# Patient Record
Sex: FEMALE | Race: WHITE | Employment: UNEMPLOYED | URBAN - METROPOLITAN AREA
[De-identification: names, ages, dates, MRNs, and addresses within clinical notes are randomized per-mention and may not be internally consistent; named-entity substitution may affect disease eponyms.]

---

## 2017-01-06 ENCOUNTER — OFFICE VISIT (OUTPATIENT)
Dept: FAMILY MEDICINE CLINIC | Age: 32
End: 2017-01-06

## 2017-01-06 VITALS
SYSTOLIC BLOOD PRESSURE: 102 MMHG | HEART RATE: 125 BPM | HEIGHT: 63 IN | WEIGHT: 132.38 LBS | RESPIRATION RATE: 16 BRPM | DIASTOLIC BLOOD PRESSURE: 74 MMHG | BODY MASS INDEX: 23.46 KG/M2 | TEMPERATURE: 97.5 F | OXYGEN SATURATION: 99 %

## 2017-01-06 DIAGNOSIS — F41.8 DEPRESSION WITH ANXIETY: ICD-10-CM

## 2017-01-06 RX ORDER — CITALOPRAM 20 MG/1
30 TABLET, FILM COATED ORAL DAILY
Qty: 45 TAB | Refills: 5 | Status: SHIPPED | OUTPATIENT
Start: 2017-01-06 | End: 2017-04-26 | Stop reason: SDUPTHER

## 2017-01-06 RX ORDER — CLONAZEPAM 1 MG/1
1 TABLET ORAL 3 TIMES DAILY
Qty: 90 TAB | Refills: 2 | Status: SHIPPED | OUTPATIENT
Start: 2017-01-06 | End: 2017-04-26 | Stop reason: SDUPTHER

## 2017-01-06 RX ORDER — CITALOPRAM 20 MG/1
TABLET, FILM COATED ORAL DAILY
COMMUNITY
End: 2017-01-06 | Stop reason: SDUPTHER

## 2017-01-06 NOTE — MR AVS SNAPSHOT
Visit Information Date & Time Provider Department Dept. Phone Encounter #  
 1/6/2017  4:00 PM Nadir Begum NP [facility] [phone] [id number] Upcoming Health Maintenance Date Due Pneumococcal 19-64 Medium Risk (1 of 1 - PPSV23) 10/15/2005 DTaP/Tdap/Td series (1 - Tdap) 10/15/2007 INFLUENZA AGE 9 TO ADULT 8/1/2016 PAP AKA CERVICAL CYTOLOGY 8/22/2016 Allergies as of 1/6/2017  Review Complete On: 1/6/2017 By: Juan C Nowak LPN Severity Noted Reaction Type Reactions Lortab [Hydrocodone-acetaminophen]  05/20/2010    Other (comments) FACIAL SWELLING Morphine  02/02/2016    Other (comments) Blisters on body Current Immunizations  Reviewed on 9/29/2014 Name Date Influenza Vaccine 9/25/2013 Influenza Vaccine Split 11/12/2012, 10/18/2011, 11/4/2010 Not reviewed this visit You Were Diagnosed With   
  
 Codes Comments Depression with anxiety     ICD-10-CM: F41.8 ICD-9-CM: 300.4 Vitals BP Pulse Temp Resp Height(growth percentile) Weight(growth percentile) 102/74 (!) 125 97.5 °F (36.4 °C) (Oral) 16 5' 3\" (1.6 m) 132 lb 6 oz (60 kg) SpO2 BMI OB Status Smoking Status 99% 23.45 kg/m2 Recent pregnancy Never Smoker Vitals History BMI and BSA Data Body Mass Index Body Surface Area  
 23.45 kg/m 2 1.63 m 2 Preferred Pharmacy Pharmacy Name Phone [facility] [phone] Your Updated Medication List  
  
   
This list is accurate as of: 1/6/17  4:26 PM.  Always use your most recent med list.  
  
  
  
  
 citalopram 20 mg tablet Commonly known as:  Medina Bronson Take 1.5 Tabs by mouth daily. clindamycin 1 % lotion Commonly known as:  CLEOCIN T Apply  to affected area two (2) times a day. use thin film on affected area  
  
 clonazePAM 1 mg tablet Commonly known as:  Mili Man  
 Take 1 Tab by mouth three (3) times daily. As needed for anxiety - must last 30 days * dextroamphetamine-amphetamine 20 mg tablet Commonly known as:  ADDERALL Take 1 Tab by mouth two (2) times a day. - must last 30 days * dextroamphetamine-amphetamine 20 mg tablet Commonly known as:  ADDERALL Take 1 Tab by mouth two (2) times a day. - must last 30 days * dextroamphetamine-amphetamine 20 mg tablet Commonly known as:  ADDERALL Take 1 Tab by mouth two (2) times a day. - must last 30 days  
  
 lamoTRIgine 25 mg tablet Commonly known as: LaMICtal  
Take 1 Tab by mouth two (2) times a day. levonorgestrel-ethinyl estradiol 0.1-20 mg-mcg Tab Commonly known as:  Ottie Bark Take 1 Tab by mouth daily. * Notice: This list has 3 medication(s) that are the same as other medications prescribed for you. Read the directions carefully, and ask your doctor or other care provider to review them with you. Prescriptions Printed Refills  
 clonazePAM (KLONOPIN) 1 mg tablet 2 Sig: Take 1 Tab by mouth three (3) times daily. As needed for anxiety - must last 30 days Class: Print Route: Oral  
  
Prescriptions Sent to Pharmacy Refills  
 citalopram (CELEXA) 20 mg tablet 5 Sig: Take 1.5 Tabs by mouth daily. Class: Normal  
 Pharmacy: [facility] [place] [address] #: [phone] Route: Oral  
  
Introducing [facility] & HEALTH SERVICES! July Sanchez introduces [employer] patient portal. Now you can access parts of your medical record, email your doctor's office, and request medication refills online. 1. In your internet browser, go to [email or web address] 2. Click on the First Time User? Click Here link in the Sign In box. You will see the New Member Sign Up page. 3. Enter your FanHero Access Code exactly as it appears below.  You will not need to use this code after youve completed the sign-up process. If you do not sign up before the expiration date, you must request a new code. · [employer] Access Code: [email or web address] Expires: 4/6/2017  4:26 PM 
 
4. Enter the last four digits of your Social Security Number (xxxx) and Date of Birth (mm/dd/yyyy) as indicated and click Submit. You will be taken to the next sign-up page. 5. Create a [employer] ID. This will be your [employer] login ID and cannot be changed, so think of one that is secure and easy to remember. 6. Create a [employer] password. You can change your password at any time. 7. Enter your Password Reset Question and Answer. This can be used at a later time if you forget your password. 8. Enter your e-mail address. You will receive e-mail notification when new information is available in [address] 9. Click Sign Up. You can now view and download portions of your medical record. 10. Click the Download Summary menu link to download a portable copy of your medical information. If you have questions, please visit the Frequently Asked Questions section of the [employer] website. Remember, [employer] is NOT to be used for urgent needs. For medical emergencies, dial 911. Now available from your iPhone and Android! Please provide this summary of care documentation to your next provider. Your primary care clinician is listed as Jensen Cordero. If you have any questions after today's visit, please call [phone].

## 2017-01-06 NOTE — PROGRESS NOTES
1. Have you been to the ER, urgent care clinic since your last visit? Hospitalized since your last visit? Yes JW x 1 wk for childbirth    2. Have you seen or consulted any other health care providers outside of the [facility] [place] [facility] since your last visit? Include any pap smears or colon screening.  No    Chief Complaint   Patient presents with    Follow-up     med ck

## 2017-01-09 NOTE — PROGRESS NOTES
HISTORY OF PRESENT ILLNESS  Aubrey Prakash is a 28 y.o. female. HPI  1 week postpartum  Needs to restart her depression meds and anxiety meds  Had to change to zoloft due to pregnancy and stop the klonopin and adderall  Anxiety has been severe  Good bonding though with baby and holding her today in the office lovingly    ROS  A comprehensive review of system was obtained and negative except findings in the HPI    Visit Vitals    /74    Pulse (!) 125    Temp 97.5 °F (36.4 °C) (Oral)    Resp 16    Ht 5' 3\" (1.6 m)    Wt 132 lb 6 oz (60 kg)    SpO2 99%    BMI 23.45 kg/m2     Physical Exam   Constitutional: She is oriented to person, place, and time. She appears well-developed and well-nourished. Neck: No JVD present. Cardiovascular: Normal rate, regular rhythm and intact distal pulses. Exam reveals no gallop and no friction rub. No murmur heard. Pulmonary/Chest: Effort normal and breath sounds normal. No respiratory distress. She has no wheezes. Musculoskeletal: She exhibits no edema. Neurological: She is alert and oriented to person, place, and time. Skin: Skin is warm. Nursing note and vitals reviewed. ASSESSMENT and PLAN  Encounter Diagnoses   Name Primary?  Depression with anxiety      Orders Placed This Encounter    citalopram (CELEXA) 20 mg tablet    clonazePAM (KLONOPIN) 1 mg tablet     Change back to her celexa and refills updated on the klonopin  Will recheck 3 weeks and at that time discuss restarting the adderall  I have discussed the diagnosis with the patient and the intended plan as seen in the above orders. The patient has received an after-visit summary and questions were answered concerning future plans. Patient conveyed understanding of the plan at the time of the visit.     Klever Harris, MSN, ANP  1/9/2017

## 2017-04-26 ENCOUNTER — OFFICE VISIT (OUTPATIENT)
Dept: FAMILY MEDICINE CLINIC | Age: 32
End: 2017-04-26

## 2017-04-26 VITALS
HEART RATE: 105 BPM | OXYGEN SATURATION: 99 % | RESPIRATION RATE: 18 BRPM | DIASTOLIC BLOOD PRESSURE: 87 MMHG | SYSTOLIC BLOOD PRESSURE: 122 MMHG | TEMPERATURE: 98.7 F | HEIGHT: 63 IN | WEIGHT: 130.6 LBS | BODY MASS INDEX: 23.14 KG/M2

## 2017-04-26 DIAGNOSIS — F41.8 DEPRESSION WITH ANXIETY: ICD-10-CM

## 2017-04-26 RX ORDER — CITALOPRAM 40 MG/1
40 TABLET, FILM COATED ORAL DAILY
Qty: 30 TAB | Refills: 5 | Status: SHIPPED | OUTPATIENT
Start: 2017-04-26 | End: 2017-10-11 | Stop reason: SDUPTHER

## 2017-04-26 RX ORDER — CLONAZEPAM 1 MG/1
1 TABLET ORAL 3 TIMES DAILY
Qty: 90 TAB | Refills: 2 | Status: SHIPPED | OUTPATIENT
Start: 2017-04-26 | End: 2017-07-21 | Stop reason: SDUPTHER

## 2017-04-26 RX ORDER — LAMOTRIGINE 25 MG/1
25 TABLET ORAL 2 TIMES DAILY
Qty: 60 TAB | Refills: 2 | Status: SHIPPED | OUTPATIENT
Start: 2017-04-26 | End: 2017-07-21 | Stop reason: ALTCHOICE

## 2017-04-26 NOTE — MR AVS SNAPSHOT
Visit Information Date & Time Provider Department Dept. Phone Encounter #  
 4/26/2017  3:30 PM Reta Aundrea, HERB [address] [facility] [phone] [id number] Upcoming Health Maintenance Date Due Pneumococcal 19-64 Medium Risk (1 of 1 - PPSV23) 10/15/2005 DTaP/Tdap/Td series (1 - Tdap) 10/15/2007 INFLUENZA AGE 9 TO ADULT 8/1/2016 PAP AKA CERVICAL CYTOLOGY 8/22/2016 Allergies as of 4/26/2017  Review Complete On: 4/26/2017 By: Dottie Castaneda LPN Severity Noted Reaction Type Reactions Lortab [Hydrocodone-acetaminophen]  05/20/2010    Other (comments) FACIAL SWELLING Morphine  02/02/2016    Other (comments) Blisters on body Current Immunizations  Reviewed on 9/29/2014 Name Date Influenza Vaccine 9/25/2013 Influenza Vaccine Split 11/12/2012, 10/18/2011, 11/4/2010 Not reviewed this visit You Were Diagnosed With   
  
 Codes Comments Depression with anxiety     ICD-10-CM: F41.8 ICD-9-CM: 300.4 Vitals BP Pulse Temp Resp Height(growth percentile) Weight(growth percentile) 122/87 (!) 105 98.7 °F (37.1 °C) (Oral) 18 5' 3\" (1.6 m) 130 lb 9.6 oz (59.2 kg) SpO2 BMI OB Status Smoking Status 99% 23.13 kg/m2 Recent pregnancy Never Smoker Vitals History BMI and BSA Data Body Mass Index Body Surface Area  
 23.13 kg/m 2 1.62 m 2 Preferred Pharmacy Pharmacy Name Phone [id number] [address] [phone] Your Updated Medication List  
  
   
This list is accurate as of: 4/26/17  3:56 PM.  Always use your most recent med list.  
  
  
  
  
 citalopram 40 mg tablet Commonly known as:  Sanford Kerhonkson Take 1 Tab by mouth daily. clindamycin 1 % lotion Commonly known as:  CLEOCIN T Apply  to affected area two (2) times a day. use thin film on affected area  
  
 clonazePAM 1 mg tablet Commonly known as:  Sury Medici  
 Take 1 Tab by mouth three (3) times daily. As needed for anxiety - must last 30 days * dextroamphetamine-amphetamine 20 mg tablet Commonly known as:  ADDERALL Take 1 Tab by mouth two (2) times a day. - must last 30 days * dextroamphetamine-amphetamine 20 mg tablet Commonly known as:  ADDERALL Take 1 Tab by mouth two (2) times a day. - must last 30 days * dextroamphetamine-amphetamine 20 mg tablet Commonly known as:  ADDERALL Take 1 Tab by mouth two (2) times a day. - must last 30 days  
  
 lamoTRIgine 25 mg tablet Commonly known as: LaMICtal  
Take 1 Tab by mouth two (2) times a day. levonorgestrel-ethinyl estradiol 0.1-20 mg-mcg Tab Commonly known as:  Roche Prayer Take 1 Tab by mouth daily. * Notice: This list has 3 medication(s) that are the same as other medications prescribed for you. Read the directions carefully, and ask your doctor or other care provider to review them with you. Prescriptions Printed Refills  
 clonazePAM (KLONOPIN) 1 mg tablet 2 Sig: Take 1 Tab by mouth three (3) times daily. As needed for anxiety - must last 30 days Class: Print Route: Oral  
  
Prescriptions Sent to Pharmacy Refills  
 citalopram (CELEXA) 40 mg tablet 5 Sig: Take 1 Tab by mouth daily. Class: Normal  
 Pharmacy: [id number] [place] [address] Ph #: [phone] Route: Oral  
 lamoTRIgine (LAMICTAL) 25 mg tablet 2 Sig: Take 1 Tab by mouth two (2) times a day. Class: Normal  
 Pharmacy: [facility] [address] [place] [address] Ph #: [phone] Route: Oral  
  
Introducing [facility] & HEALTH SERVICES! Елена Johnson introduces [employer] patient portal. Now you can access parts of your medical record, email your doctor's office, and request medication refills online. 1. In your internet browser, go to [email or web address] 2. Click on the First Time User? Click Here link in the Sign In box. You will see the New Member Sign Up page. 3. Enter your [employer] Access Code exactly as it appears below. You will not need to use this code after youve completed the sign-up process. If you do not sign up before the expiration date, you must request a new code. · [employer] Access Code: [email or web address] Expires: 7/25/2017  3:56 PM 
 
4. Enter the last four digits of your Social Security Number (xxxx) and Date of Birth (mm/dd/yyyy) as indicated and click Submit. You will be taken to the next sign-up page. 5. Create a [employer] ID. This will be your [employer] login ID and cannot be changed, so think of one that is secure and easy to remember. 6. Create a [employer] password. You can change your password at any time. 7. Enter your Password Reset Question and Answer. This can be used at a later time if you forget your password. 8. Enter your e-mail address. You will receive e-mail notification when new information is available in [address] 9. Click Sign Up. You can now view and download portions of your medical record. 10. Click the Download Summary menu link to download a portable copy of your medical information. If you have questions, please visit the Frequently Asked Questions section of the [employer] website. Remember, [employer] is NOT to be used for urgent needs. For medical emergencies, dial 911. Now available from your iPhone and Android! Please provide this summary of care documentation to your next provider. Your primary care clinician is listed as Vergil Zonia. If you have any questions after today's visit, please call [phone].

## 2017-04-26 NOTE — PROGRESS NOTES
Chief Complaint   Patient presents with    Medication Refill    Medication Evaluation     Discuss changing medication      1. Have you been to the ER, urgent care clinic since your last visit? Hospitalized since your last visit? No    2. Have you seen or consulted any other health care providers outside of the [facility] since your last visit? Include any pap smears or colon screening.  No.

## 2017-04-28 NOTE — PROGRESS NOTES
HISTORY OF PRESENT ILLNESS  Yoel Awad is a 28 y.o. female. HPI  She is being treated for anxiety and depression without having the ADD meds for now  Having a lot of trouble with worry and anxiety  Wants to take something to level out her mood    ROS  A comprehensive review of system was obtained and negative except findings in the HPI    Visit Vitals    /87    Pulse (!) 105    Temp 98.7 °F (37.1 °C) (Oral)    Resp 18    Ht 5' 3\" (1.6 m)    Wt 130 lb 9.6 oz (59.2 kg)    SpO2 99%    BMI 23.13 kg/m2     Physical Exam   Constitutional: She is oriented to person, place, and time. She appears well-developed and well-nourished. Neck: No JVD present. Cardiovascular: Normal rate, regular rhythm and intact distal pulses. Exam reveals no gallop and no friction rub. No murmur heard. Pulmonary/Chest: Effort normal and breath sounds normal. No respiratory distress. She has no wheezes. Musculoskeletal: She exhibits no edema. Neurological: She is alert and oriented to person, place, and time. Skin: Skin is warm. Nursing note and vitals reviewed. ASSESSMENT and PLAN  Encounter Diagnoses   Name Primary?  Depression with anxiety      Orders Placed This Encounter    citalopram (CELEXA) 40 mg tablet    clonazePAM (KLONOPIN) 1 mg tablet    lamoTRIgine (LAMICTAL) 25 mg tablet     Keep the meds as is but add lamictal and refill the celexa etc as prescribed  I have discussed the diagnosis with the patient and the intended plan as seen in the above orders. The patient has received an after-visit summary and questions were answered concerning future plans. Patient conveyed understanding of the plan at the time of the visit.     Lee Alanis, MSN, ANP  4/27/2017

## 2017-07-21 ENCOUNTER — OFFICE VISIT (OUTPATIENT)
Dept: FAMILY MEDICINE CLINIC | Age: 32
End: 2017-07-21

## 2017-07-21 VITALS
OXYGEN SATURATION: 99 % | DIASTOLIC BLOOD PRESSURE: 82 MMHG | BODY MASS INDEX: 23.04 KG/M2 | HEIGHT: 63 IN | WEIGHT: 130 LBS | RESPIRATION RATE: 16 BRPM | SYSTOLIC BLOOD PRESSURE: 131 MMHG | TEMPERATURE: 98.1 F | HEART RATE: 74 BPM

## 2017-07-21 DIAGNOSIS — F98.8 ADD (ATTENTION DEFICIT DISORDER): ICD-10-CM

## 2017-07-21 DIAGNOSIS — F41.8 DEPRESSION WITH ANXIETY: Primary | ICD-10-CM

## 2017-07-21 RX ORDER — CLONAZEPAM 1 MG/1
1 TABLET ORAL 3 TIMES DAILY
Qty: 90 TAB | Refills: 2 | Status: SHIPPED | OUTPATIENT
Start: 2017-07-21 | End: 2017-08-01 | Stop reason: SDUPTHER

## 2017-07-21 NOTE — MR AVS SNAPSHOT
Visit Information Date & Time Provider Department Dept. Phone Encounter #  
 7/21/2017 11:15 AM Rey Rodriguez NP [address] [facility] [phone] [id number] Upcoming Health Maintenance Date Due Pneumococcal 19-64 Medium Risk (1 of 1 - PPSV23) 10/15/2005 DTaP/Tdap/Td series (1 - Tdap) 10/15/2007 PAP AKA CERVICAL CYTOLOGY 8/22/2016 INFLUENZA AGE 9 TO ADULT 8/1/2017 Allergies as of 7/21/2017  Review Complete On: 7/21/2017 By: Christian Ferrara LPN Severity Noted Reaction Type Reactions Lortab [Hydrocodone-acetaminophen]  05/20/2010    Other (comments) FACIAL SWELLING Morphine  02/02/2016    Other (comments) Blisters on body Current Immunizations  Reviewed on 9/29/2014 Name Date Influenza Vaccine 9/25/2013 Influenza Vaccine Split 11/12/2012, 10/18/2011, 11/4/2010 Not reviewed this visit You Were Diagnosed With   
  
 Codes Comments Depression with anxiety    -  Primary ICD-10-CM: F41.8 ICD-9-CM: 300.4 ADD (attention deficit disorder)     ICD-10-CM: F98.8 ICD-9-CM: 314.00 Vitals BP Pulse Temp Resp Height(growth percentile) Weight(growth percentile) 131/82 (BP 1 Location: Right arm, BP Patient Position: Sitting) 74 98.1 °F (36.7 °C) (Oral) 16 5' 3\" (1.6 m) 130 lb (59 kg) LMP SpO2 BMI OB Status Smoking Status 06/28/2017 (Exact Date) 99% 23.03 kg/m2 Having regular periods Never Smoker Vitals History BMI and BSA Data Body Mass Index Body Surface Area 23.03 kg/m 2 1.62 m 2 Preferred Pharmacy Pharmacy Name Phone [facility] [phone] Your Updated Medication List  
  
   
This list is accurate as of: 7/21/17 11:41 AM.  Always use your most recent med list.  
  
  
  
  
 citalopram 40 mg tablet Commonly known as:  Luzmaria Park Take 1 Tab by mouth daily. clonazePAM 1 mg tablet Commonly known as:  Florida Cord Take 1 Tab by mouth three (3) times daily. As needed for anxiety - must last 30 days Prescriptions Printed Refills  
 clonazePAM (KLONOPIN) 1 mg tablet 2 Sig: Take 1 Tab by mouth three (3) times daily. As needed for anxiety - must last 30 days Class: Print Route: Oral  
  
Introducing [facility] & HE[facility] SERVICES! Bharatpaul Diane introduces [employer] patient portal. Now you can access parts of your medical record, email your doctor's office, and request medication refills online. 1. In your internet browser, go to [email or web address] 2. Click on the First Time User? Click Here link in the Sign In box. You will see the New Member Sign Up page. 3. Enter your [employer] Access Code exactly as it appears below. You will not need to use this code after youve completed the sign-up process. If you do not sign up before the expiration date, you must request a new code. · [employer] Access Code: [email or web address] Expires: 7/25/2017  3:56 PM 
 
4. Enter the last four digits of your Social Security Number (xxxx) and Date of Birth (mm/dd/yyyy) as indicated and click Submit. You will be taken to the next sign-up page. 5. Create a [employer]ie ID. This will be your [employer] login ID and cannot be changed, so think of one that is secure and easy to remember. 6. Create a [employer] password. You can change your password at any time. 7. Enter your Password Reset Question and Answer. This can be used at a later time if you forget your password. 8. Enter your e-mail address. You will receive e-mail notification when new information is available in [address] 9. Click Sign Up. You can now view and download portions of your medical record. 10. Click the Download Summary menu link to download a portable copy of your medical information.  
 
If you have questions, please visit the Frequently Asked Questions section of the [employer]. Remember, [employer] is NOT to be used for urgent needs. For medical emergencies, dial 911. Now available from your iPhone and Android! Please provide this summary of care documentation to your next provider. Your primary care clinician is listed as Luis White. If you have any questions after today's visit, please call [phone].

## 2017-07-24 NOTE — PROGRESS NOTES
HISTORY OF PRESENT ILLNESS  Josue Best is a 28 y.o. female. HPI  She is here for her refill of celexa and klonopin  Mood has been bad, youngest son has been dx with Aspergers and she is trying to get him ready for Kindergarden in the fall  Also has 5 week old baby   Stressed out and no help with father of the boys    ROS  A comprehensive review of system was obtained and negative except findings in the HPI    Visit Vitals    /82 (BP 1 Location: Right arm, BP Patient Position: Sitting)    Pulse 74    Temp 98.1 °F (36.7 °C) (Oral)    Resp 16    Ht 5' 3\" (1.6 m)    Wt 130 lb (59 kg)    LMP 06/28/2017 (Exact Date)    SpO2 99%    BMI 23.03 kg/m2     Physical Exam   Constitutional: She is oriented to person, place, and time. She appears well-developed and well-nourished. Neck: No JVD present. Cardiovascular: Normal rate, regular rhythm and intact distal pulses. Exam reveals no gallop and no friction rub. No murmur heard. Pulmonary/Chest: Effort normal and breath sounds normal. No respiratory distress. She has no wheezes. Musculoskeletal: She exhibits no edema. Neurological: She is alert and oriented to person, place, and time. Skin: Skin is warm. Nursing note and vitals reviewed. ASSESSMENT and PLAN  Encounter Diagnoses   Name Primary?  Depression with anxiety Yes    ADD (attention deficit disorder)      Orders Placed This Encounter    clonazePAM (KLONOPIN) 1 mg tablet     Refills given of klonopin 1mg bid  Follow up prn  No change in celexa    I have discussed the diagnosis with the patient and the intended plan as seen in the above orders. The patient has received an after-visit summary and questions were answered concerning future plans. Patient conveyed understanding of the plan at the time of the visit.     Paola Mott, MSN, ANP  7/23/2017

## 2017-08-01 DIAGNOSIS — F41.8 DEPRESSION WITH ANXIETY: ICD-10-CM

## 2017-08-01 RX ORDER — CLONAZEPAM 1 MG/1
1 TABLET ORAL 3 TIMES DAILY
Qty: 60 TAB | Refills: 0 | OUTPATIENT
Start: 2017-08-01 | End: 2017-10-11 | Stop reason: SDUPTHER

## 2017-10-11 ENCOUNTER — OFFICE VISIT (OUTPATIENT)
Dept: FAMILY MEDICINE CLINIC | Age: 32
End: 2017-10-11

## 2017-10-11 VITALS
OXYGEN SATURATION: 99 % | HEART RATE: 95 BPM | HEIGHT: 63 IN | WEIGHT: 124.4 LBS | SYSTOLIC BLOOD PRESSURE: 122 MMHG | TEMPERATURE: 98.3 F | BODY MASS INDEX: 22.04 KG/M2 | RESPIRATION RATE: 18 BRPM | DIASTOLIC BLOOD PRESSURE: 60 MMHG

## 2017-10-11 DIAGNOSIS — F98.8 ATTENTION DEFICIT DISORDER, UNSPECIFIED HYPERACTIVITY PRESENCE: Primary | ICD-10-CM

## 2017-10-11 DIAGNOSIS — F41.8 DEPRESSION WITH ANXIETY: ICD-10-CM

## 2017-10-11 RX ORDER — CITALOPRAM 40 MG/1
40 TABLET, FILM COATED ORAL DAILY
Qty: 30 TAB | Refills: 5 | Status: SHIPPED | OUTPATIENT
Start: 2017-10-11 | End: 2018-06-01 | Stop reason: SDUPTHER

## 2017-10-11 RX ORDER — DEXTROAMPHETAMINE SACCHARATE, AMPHETAMINE ASPARTATE, DEXTROAMPHETAMINE SULFATE AND AMPHETAMINE SULFATE 2.5; 2.5; 2.5; 2.5 MG/1; MG/1; MG/1; MG/1
10 TABLET ORAL 2 TIMES DAILY
Qty: 60 TAB | Refills: 0 | Status: SHIPPED | OUTPATIENT
Start: 2017-10-11 | End: 2017-11-15 | Stop reason: SDUPTHER

## 2017-10-11 RX ORDER — CLONAZEPAM 1 MG/1
1 TABLET ORAL 3 TIMES DAILY
Qty: 90 TAB | Refills: 1 | Status: SHIPPED | OUTPATIENT
Start: 2017-10-11 | End: 2017-12-12 | Stop reason: SDUPTHER

## 2017-10-11 NOTE — MR AVS SNAPSHOT
Visit Information Date & Time Provider Department Dept. Phone Encounter #  
 10/11/2017  3:30 PM Citlali De La Cruz, [address] [phone] [id number] Upcoming Health Maintenance Date Due Pneumococcal 19-64 Medium Risk (1 of 1 - PPSV23) 10/15/2005 DTaP/Tdap/Td series (1 - Tdap) 10/15/2007 PAP AKA CERVICAL CYTOLOGY 8/22/2016 INFLUENZA AGE 9 TO ADULT 8/1/2017 Allergies as of 10/11/2017  Review Complete On: 10/11/2017 By: Lacey Antonio LPN Severity Noted Reaction Type Reactions Lortab [Hydrocodone-acetaminophen]  05/20/2010    Other (comments) FACIAL SWELLING Morphine  02/02/2016    Other (comments) Blisters on body - 
 Denies Medication Allergy/Reaction on 10/11/2017 Current Immunizations  Reviewed on 9/29/2014 Name Date Influenza Vaccine 9/25/2013 Influenza Vaccine Split 11/12/2012, 10/18/2011, 11/4/2010 Not reviewed this visit You Were Diagnosed With   
  
 Codes Comments Attention deficit disorder, unspecified hyperactivity presence    -  Primary ICD-10-CM: F98.8 ICD-9-CM: 314.00 Depression with anxiety     ICD-10-CM: F41.8 ICD-9-CM: 300.4 Vitals BP Pulse Temp Resp Height(growth percentile) Weight(growth percentile) 122/60 95 98.3 °F (36.8 °C) (Oral) 18 5' 3\" (1.6 m) 124 lb 6.4 oz (56.4 kg) SpO2 BMI OB Status Smoking Status 99% 22.04 kg/m2 Having regular periods Never Smoker Vitals History BMI and BSA Data Body Mass Index Body Surface Area 22.04 kg/m 2 1.58 m 2 Preferred Pharmacy Pharmacy Name Phone [id number] [place] [phone] Your Updated Medication List  
  
   
This list is accurate as of: 10/11/17  3:49 PM.  Always use your most recent med list.  
  
  
  
  
 citalopram 40 mg tablet Commonly known as:  Frida Adorno Take 1 Tab by mouth daily. clonazePAM 1 mg tablet Commonly known as:  Hailey Broaden Take 1 Tab by mouth three (3) times daily. As needed for anxiety - must last 30 days  
  
 dextroamphetamine-amphetamine 10 mg tablet Commonly known as:  ADDERALL Take 1 Tab (10 mg total) by mouth two (2) times a day. Max Daily Amount: 20 mg  
  
  
  
  
Prescriptions Printed Refills  
 clonazePAM (KLONOPIN) 1 mg tablet 1 Sig: Take 1 Tab by mouth three (3) times daily. As needed for anxiety - must last 30 days Class: Print Route: Oral  
 dextroamphetamine-amphetamine (ADDERALL) 10 mg tablet 0 Sig: Take 1 Tab (10 mg total) by mouth two (2) times a day. Max Daily Amount: 20 mg  
 Class: Print Route: Oral  
  
Prescriptions Sent to Pharmacy Refills  
 citalopram (CELEXA) 40 mg tablet 5 Sig: Take 1 Tab by mouth daily. Class: Normal  
 Pharmacy: [id number] [address] [place] [address] #: [phone] Route: Oral  
  
Introducing [facility] & HEALTH SERVICES! Katlin Vasquez introduces Localler patient portal. Now you can access parts of your medical record, email your doctor's office, and request medication refills online. 1. In your internet browser, go to [email or web address] 2. Click on the First Time User? Click Here link in the Sign In box. You will see the New Member Sign Up page. 3. Enter your Localler Access Code exactly as it appears below. You will not need to use this code after youve completed the sign-up process. If you do not sign up before the expiration date, you must request a new code. · Localler Access Code: [email or web address] Expires: 1/9/2018  3:49 PM 
 
4. Enter the last four digits of your Social Security Number (xxxx) and Date of Birth (mm/dd/yyyy) as indicated and click Submit. You will be taken to the next sign-up page. 5. Create a Localler ID. This will be your Localler login ID and cannot be changed, so think of one that is secure and easy to remember. 6. Create a MoBeam password. You can change your password at any time. 7. Enter your Password Reset Question and Answer. This can be used at a later time if you forget your password. 8. Enter your e-mail address. You will receive e-mail notification when new information is available in [address] 9. Click Sign Up. You can now view and download portions of your medical record. 10. Click the Download Summary menu link to download a portable copy of your medical information. If you have questions, please visit the Frequently Asked Questions section of the MoBeam website. Remember, MoBeam is NOT to be used for urgent needs. For medical emergencies, dial 911. Now available from your iPhone and Android! Please provide this summary of care documentation to your next provider. Your primary care clinician is listed as Marnie Hahn. If you have any questions after today's visit, please call [phone].

## 2017-10-11 NOTE — PROGRESS NOTES
Chief Complaint   Patient presents with    Medication Refill     1. Have you been to the ER, urgent care clinic since your last visit? Hospitalized since your last visit? No    2. Have you seen or consulted any other health care providers outside of the [address] [place] [address] since your last visit? Include any pap smears or colon screening.  No

## 2017-10-13 NOTE — PROGRESS NOTES
HISTORY OF PRESENT ILLNESS  Lonnie Morgan is a 28 y.o. female. 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 She appears well-developed and well-nourished. Neck: No JVD present. Cardiovascular: Normal rate, regular rhythm and intact distal pulses. Exam reveals no gallop and no friction rub. No murmur heard. Pulmonary/Chest: Effort normal and breath sounds normal. No respiratory distress. She has no wheezes. Musculoskeletal: She exhibits no edema. Neurological: She is alert and oriented to person, place, and time. Skin: Skin is warm. Nursing note and vitals reviewed. ASSESSMENT and PLAN  Encounter Diagnoses   Name Primary?  Attention deficit disorder, unspecified hyperactivity presence Yes    Depression with anxiety      Orders Placed This Encounter    clonazePAM (KLONOPIN) 1 mg tablet    citalopram (CELEXA) 40 mg tablet    dextroamphetamine-amphetamine (ADDERALL) 10 mg tablet     Given klonopin and adderall rx today  Refilled celexa as well  Recheck 3 mo  I have discussed the diagnosis with the patient and the intended plan as seen in the above orders. The patient has received an after-visit summary and questions were answered concerning future plans. Patient conveyed understanding of the plan at the time of the visit.     Chucky Garner, MSN, ANP  10/12/2017

## 2017-11-15 ENCOUNTER — OFFICE VISIT (OUTPATIENT)
Dept: FAMILY MEDICINE CLINIC | Age: 32
End: 2017-11-15

## 2017-11-15 VITALS
OXYGEN SATURATION: 98 % | BODY MASS INDEX: 22.35 KG/M2 | WEIGHT: 126.13 LBS | DIASTOLIC BLOOD PRESSURE: 72 MMHG | SYSTOLIC BLOOD PRESSURE: 128 MMHG | RESPIRATION RATE: 16 BRPM | HEIGHT: 63 IN | HEART RATE: 84 BPM | TEMPERATURE: 97.7 F

## 2017-11-15 DIAGNOSIS — F98.8 ATTENTION DEFICIT DISORDER, UNSPECIFIED HYPERACTIVITY PRESENCE: Primary | ICD-10-CM

## 2017-11-15 RX ORDER — DEXTROAMPHETAMINE SACCHARATE, AMPHETAMINE ASPARTATE, DEXTROAMPHETAMINE SULFATE AND AMPHETAMINE SULFATE 5; 5; 5; 5 MG/1; MG/1; MG/1; MG/1
20 TABLET ORAL DAILY
Qty: 30 TAB | Refills: 0 | Status: SHIPPED | OUTPATIENT
Start: 2017-11-15 | End: 2017-12-12 | Stop reason: SDUPTHER

## 2017-11-15 RX ORDER — DEXTROAMPHETAMINE SACCHARATE, AMPHETAMINE ASPARTATE, DEXTROAMPHETAMINE SULFATE AND AMPHETAMINE SULFATE 2.5; 2.5; 2.5; 2.5 MG/1; MG/1; MG/1; MG/1
10 TABLET ORAL DAILY
Qty: 30 TAB | Refills: 0 | Status: SHIPPED | OUTPATIENT
Start: 2017-11-15 | End: 2017-11-15 | Stop reason: SDUPTHER

## 2017-11-15 NOTE — PROGRESS NOTES
HISTORY OF PRESENT ILLNESS  Niurka Lawton is a 33 y.o. female. HPI  Patient returns to office for follow up ADD. Stable on medication without weight loss, decreased appetite, insomnia, or tremor. Good focus control. Gets three months of scripts at a time. She is managed on Adderall 20mg bid but wants to take just at night now. 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 She appears well-developed and well-nourished. Neck: No JVD present. Cardiovascular: Normal rate, regular rhythm and intact distal pulses. Exam reveals no gallop and no friction rub. No murmur heard. Pulmonary/Chest: Effort normal and breath sounds normal. No respiratory distress. She has no wheezes. Musculoskeletal: She exhibits no edema. Neurological: She is alert and oriented to person, place, and time. Skin: Skin is warm. Nursing note and vitals reviewed. ASSESSMENT and PLAN  Encounter Diagnoses   Name Primary?  Attention deficit disorder, unspecified hyperactivity presence Yes     Orders Placed This Encounter    dextroamphetamine-amphetamine (ADDERALL) 20 mg tablet     Given adderall 20mg refill   Reviewed adr/se of med and recheck 1mo  No med changes at this time    I have discussed the diagnosis with the patient and the intended plan as seen in the above orders. The patient has received an after-visit summary and questions were answered concerning future plans. Patient conveyed understanding of the plan at the time of the visit.     Ryan Washington, MSN, ANP  11/15/2017

## 2017-11-15 NOTE — PROGRESS NOTES
1. Have you been to the ER, urgent care clinic since your last visit? Hospitalized since your last visit? No    2. Have you seen or consulted any other health care providers outside of the [address] [place] [address] since your last visit? Include any pap smears or colon screening.  No    Chief Complaint   Patient presents with    Follow-up     3 mo f/u, med ck

## 2017-11-15 NOTE — MR AVS SNAPSHOT
Visit Information Date & Time Provider Department Dept. Phone Encounter #  
 11/15/2017 11:15 AM Yu Trinh NP [facility] [phone] [id number] Follow-up Instructions Return in about 3 weeks (around 12/6/2017) for med check. Upcoming Health Maintenance Date Due Pneumococcal 19-64 Medium Risk (1 of 1 - PPSV23) 10/15/2005 DTaP/Tdap/Td series (1 - Tdap) 10/15/2007 PAP AKA CERVICAL CYTOLOGY 8/22/2016 Influenza Age 5 to Adult 8/1/2017 Allergies as of 11/15/2017  Review Complete On: 11/15/2017 By: Yu Trinh NP Severity Noted Reaction Type Reactions Lortab [Hydrocodone-acetaminophen]  05/20/2010    Other (comments) FACIAL SWELLING Morphine  02/02/2016    Other (comments) Blisters on body - 
 Denies Medication Allergy/Reaction on 10/11/2017 Current Immunizations  Reviewed on 9/29/2014 Name Date Influenza Vaccine 9/25/2013 Influenza Vaccine Split 11/12/2012, 10/18/2011, 11/4/2010 Not reviewed this visit You Were Diagnosed With   
  
 Codes Comments Attention deficit disorder, unspecified hyperactivity presence    -  Primary ICD-10-CM: F98.8 ICD-9-CM: 314.00 Vitals BP Pulse Temp Resp Height(growth percentile) Weight(growth percentile) 128/72 84 97.7 °F (36.5 °C) (Oral) 16 5' 3\" (1.6 m) 126 lb 2 oz (57.2 kg) LMP SpO2 BMI OB Status Smoking Status 10/17/2017 (Approximate) 98% 22.34 kg/m2 Having regular periods Never Smoker Vitals History BMI and BSA Data Body Mass Index Body Surface Area  
 22.34 kg/m 2 1.59 m 2 Preferred Pharmacy Pharmacy Name Phone [id number] [place] [phone] Your Updated Medication List  
  
   
This list is accurate as of: 11/15/17 11:25 AM.  Always use your most recent med list.  
  
  
  
  
 citalopram 40 mg tablet Commonly known as:  Justen Diaz Take 1 Tab by mouth daily. clonazePAM 1 mg tablet Commonly known as:  Cassie Blaze Take 1 Tab by mouth three (3) times daily. As needed for anxiety - must last 30 days  
  
 dextroamphetamine-amphetamine 10 mg tablet Commonly known as:  ADDERALL Take 1 Tab (10 mg total) by mouth daily. Max Daily Amount: 10 mg  
  
  
  
  
Prescriptions Printed Refills  
 dextroamphetamine-amphetamine (ADDERALL) 10 mg tablet 0 Sig: Take 1 Tab (10 mg total) by mouth daily. Max Daily Amount: 10 mg  
 Class: Print Route: Oral  
  
Follow-up Instructions Return in about 3 weeks (around 12/6/2017) for med check. Introducing [facility] & HEALTH SERVICES! Dagmar Lynn introduces [employer]er [employer] patient portal. Now you can access parts of your medical record, email your doctor's office, and request medication refills online. 1. In your internet browser, go to [email or web address] 2. Click on the First Time User? Click Here link in the Sign In box. You will see the New Member Sign Up page. 3. Enter your Progressive [employer]er Tools Access Code exactly as it appears below. You will not need to use this code after youve completed the sign-up process. If you do not sign up before the expiration date, you must request a new code. · [employer] Tools Access Code: [email or web address] Expires: 1/9/2018  2:49 PM 
 
4. Enter the last four digits of your Social Security Number (xxxx) and Date of Birth (mm/dd/yyyy) as indicated and click Submit. You will be taken to the next sign-up page. 5. Create a Wildfiret ID. This will be your Progressive [employer]er Tools login ID and cannot be changed, so think of one that is secure and easy to remember. 6. Create a Progressive Dealer Tools password. You can change your password at any time. 7. Enter your Password Reset Question and Answer. This can be used at a later time if you forget your password. 8. Enter your e-mail address. You will receive e-mail notification when new information is available in [address] 9. Click Sign Up. You can now view and download portions of your medical record. 10. Click the Download Summary menu link to download a portable copy of your medical information. If you have questions, please visit the Frequently Asked Questions section of the Certify website. Remember, Certify is NOT to be used for urgent needs. For medical emergencies, dial 911. Now available from your iPhone and Android! Please provide this summary of care documentation to your next provider. Your primary care clinician is listed as Maile Tom. If you have any questions after today's visit, please call [phone].

## 2017-12-12 ENCOUNTER — OFFICE VISIT (OUTPATIENT)
Dept: FAMILY MEDICINE CLINIC | Age: 32
End: 2017-12-12

## 2017-12-12 VITALS
TEMPERATURE: 97.7 F | DIASTOLIC BLOOD PRESSURE: 70 MMHG | SYSTOLIC BLOOD PRESSURE: 122 MMHG | BODY MASS INDEX: 22.9 KG/M2 | WEIGHT: 129.25 LBS | RESPIRATION RATE: 16 BRPM | HEART RATE: 89 BPM | OXYGEN SATURATION: 100 % | HEIGHT: 63 IN

## 2017-12-12 DIAGNOSIS — F41.8 DEPRESSION WITH ANXIETY: ICD-10-CM

## 2017-12-12 DIAGNOSIS — F98.8 ATTENTION DEFICIT DISORDER, UNSPECIFIED HYPERACTIVITY PRESENCE: Primary | ICD-10-CM

## 2017-12-12 RX ORDER — DEXTROAMPHETAMINE SACCHARATE, AMPHETAMINE ASPARTATE, DEXTROAMPHETAMINE SULFATE AND AMPHETAMINE SULFATE 2.5; 2.5; 2.5; 2.5 MG/1; MG/1; MG/1; MG/1
10 TABLET ORAL DAILY
Qty: 30 TAB | Refills: 0 | Status: SHIPPED | OUTPATIENT
Start: 2017-12-12 | End: 2018-04-04 | Stop reason: SDUPTHER

## 2017-12-12 RX ORDER — CLONAZEPAM 1 MG/1
1 TABLET ORAL 3 TIMES DAILY
Qty: 90 TAB | Refills: 1 | Status: SHIPPED | OUTPATIENT
Start: 2017-12-12 | End: 2018-02-07 | Stop reason: SDUPTHER

## 2017-12-12 NOTE — MR AVS SNAPSHOT
Visit Information Date & Time Provider Department Dept. Phone Encounter #  
 12/12/2017 11:00 AM Jensen Cordero NP [facility] [phone] [id number] Upcoming Health Maintenance Date Due Pneumococcal 19-64 Medium Risk (1 of 1 - PPSV23) 10/15/2005 DTaP/Tdap/Td series (1 - Tdap) 10/15/2007 PAP AKA CERVICAL CYTOLOGY 8/22/2016 Influenza Age 5 to Adult 8/1/2017 Allergies as of 12/12/2017  Review Complete On: 12/12/2017 By: Santiago Durham LPN Severity Noted Reaction Type Reactions Lortab [Hydrocodone-acetaminophen]  05/20/2010    Other (comments) FACIAL SWELLING Morphine  02/02/2016    Other (comments) Blisters on body - 
 Denies Medication Allergy/Reaction on 10/11/2017 Current Immunizations  Reviewed on 9/29/2014 Name Date Influenza Vaccine 9/25/2013 Influenza Vaccine Split 11/12/2012, 10/18/2011, 11/4/2010 Not reviewed this visit You Were Diagnosed With   
  
 Codes Comments Attention deficit disorder, unspecified hyperactivity presence    -  Primary ICD-10-CM: F98.8 ICD-9-CM: 314.00 Depression with anxiety     ICD-10-CM: F41.8 ICD-9-CM: 300.4 Vitals BP Pulse Temp Resp Height(growth percentile) Weight(growth percentile) 122/70 89 97.7 °F (36.5 °C) (Oral) 16 5' 3\" (1.6 m) 129 lb 4 oz (58.6 kg) LMP SpO2 BMI OB Status Smoking Status 12/10/2017 (Exact Date) 100% 22.9 kg/m2 Having regular periods Never Smoker Vitals History BMI and BSA Data Body Mass Index Body Surface Area  
 22.9 kg/m 2 1.61 m 2 Your Updated Medication List  
  
   
This list is accurate as of: 12/12/17 11:36 AM.  Always use your most recent med list.  
  
  
  
  
 citalopram 40 mg tablet Commonly known as:  Jung Flatter Take 1 Tab by mouth daily. clonazePAM 1 mg tablet Commonly known as:  Chantal [place] Take 1 Tab by mouth three (3) times daily. As needed for anxiety - must last 30 days * dextroamphetamine-amphetamine 10 mg tablet Commonly known as:  ADDERALL Take 1 Tab (10 mg total) by mouth daily. - must last 30 days Max Daily Amount: 10 mg  
  
 * dextroamphetamine-amphetamine 10 mg tablet Commonly known as:  ADDERALL Take 1 Tab (10 mg total) by mouth daily. - must last 30 days Max Daily Amount: 10 mg  
  
 * dextroamphetamine-amphetamine 10 mg tablet Commonly known as:  ADDERALL Take 1 Tab (10 mg total) by mouth daily. - must last 30 days Max Daily Amount: 10 mg  
  
 * Notice: This list has 3 medication(s) that are the same as other medications prescribed for you. Read the directions carefully, and ask your doctor or other care provider to review them with you. Prescriptions Printed Refills  
 dextroamphetamine-amphetamine (ADDERALL) 10 mg tablet 0 Sig: Take 1 Tab (10 mg total) by mouth daily. - must last 30 days Max Daily Amount: 10 mg  
 Class: Print Route: Oral  
 dextroamphetamine-amphetamine (ADDERALL) 10 mg tablet 0 Sig: Take 1 Tab (10 mg total) by mouth daily. - must last 30 days Max Daily Amount: 10 mg  
 Class: Print Route: Oral  
 dextroamphetamine-amphetamine (ADDERALL) 10 mg tablet 0 Sig: Take 1 Tab (10 mg total) by mouth daily. - must last 30 days Max Daily Amount: 10 mg  
 Class: Print Route: Oral  
 clonazePAM (KLONOPIN) 1 mg tablet 1 Sig: Take 1 Tab by mouth three (3) times daily. As needed for anxiety - must last 30 days Class: Print Route: Oral  
  
Introducing [facility] & HEALTH SERVICES! [employer] introduces Palmer Hargreaves patient portal. Now you can access parts of your medical record, email your doctor's office, and request medication refills online. 1. In your internet browser, go to [email or web address] 2. Click on the First Time User? Click Here link in the Sign In box. You will see the New Member Sign Up page. 3. Enter your Palmer Hargreaves Access Code exactly as it appears below.  You will not need to use this code after youve completed the sign-up process. If you do not sign up before the expiration date, you must request a new code. · [employer] Access Code: [email or web address] Expires: 1/9/2018  2:49 PM 
 
4. Enter the last four digits of your Social Security Number (xxxx) and Date of Birth (mm/dd/yyyy) as indicated and click Submit. You will be taken to the next sign-up page. 5. Create a Weaver Labs ID. This will be your [employer] login ID and cannot be changed, so think of one that is secure and easy to remember. 6. Create a [employer] Labs password. You can change your password at any time. 7. Enter your Password Reset Question and Answer. This can be used at a later time if you forget your password. 8. Enter your e-mail address. You will receive e-mail notification when new information is available in [address] 9. Click Sign Up. You can now view and download portions of your medical record. 10. Click the Download Summary menu link to download a portable copy of your medical information. If you have questions, please visit the Frequently Asked Questions section of the [employer] website. Remember, [employer] is NOT to be used for urgent needs. For medical emergencies, dial 911. Now available from your iPhone and Android! Please provide this summary of care documentation to your next provider. Your primary care clinician is listed as Beryle Cocks. If you have any questions after today's visit, please call [phone].

## 2017-12-12 NOTE — PROGRESS NOTES
1. Have you been to the ER, urgent care clinic since your last visit? Hospitalized since your last visit? No    2. Have you seen or consulted any other health care providers outside of the [address] [place] [address] since your last visit? Include any pap smears or colon screening.  No    Chief Complaint   Patient presents with    Follow-up     3 wk f/u, med ck

## 2017-12-13 NOTE — PROGRESS NOTES
HISTORY OF PRESENT ILLNESS  Dulce Porter is a 33 y.o. female. HPI  She is here for recheck ADD, she was put on adderall 20mg but wants to go back to 10mg a day  Makes her jittery to take bid or at a higher dose  No adr/se of meds   Due for klonopin refills as well, does help with anxiety along with the celexa    ROS  A comprehensive review of system was obtained and negative except findings in the HPI    Visit Vitals    /70    Pulse 89    Temp 97.7 °F (36.5 °C) (Oral)    Resp 16    Ht 5' 3\" (1.6 m)    Wt 129 lb 4 oz (58.6 kg)    LMP 12/10/2017 (Exact Date)    SpO2 100%    BMI 22.9 kg/m2     Physical Exam   Constitutional: She is oriented to person, place, and time. She appears well-developed and well-nourished. Neck: No JVD present. Cardiovascular: Normal rate, regular rhythm and intact distal pulses. Exam reveals no gallop and no friction rub. No murmur heard. Pulmonary/Chest: Effort normal and breath sounds normal. No respiratory distress. She has no wheezes. Musculoskeletal: She exhibits no edema. Neurological: She is alert and oriented to person, place, and time. Skin: Skin is warm. Nursing note and vitals reviewed. ASSESSMENT and PLAN  Encounter Diagnoses   Name Primary?  Attention deficit disorder, unspecified hyperactivity presence Yes    Depression with anxiety      Orders Placed This Encounter    dextroamphetamine-amphetamine (ADDERALL) 10 mg tablet    dextroamphetamine-amphetamine (ADDERALL) 10 mg tablet    dextroamphetamine-amphetamine (ADDERALL) 10 mg tablet    clonazePAM (KLONOPIN) 1 mg tablet     Given adderall x 3 mo at lower milligram  Refills of klonopin  Recheck 3 mo  I have discussed the diagnosis with the patient and the intended plan as seen in the above orders. The patient has received an after-visit summary and questions were answered concerning future plans.  Patient conveyed understanding of the plan at the time of the visit.    Sena Mejia, MSN, ANP  12/12/2017

## 2018-02-07 ENCOUNTER — OFFICE VISIT (OUTPATIENT)
Dept: FAMILY MEDICINE CLINIC | Age: 33
End: 2018-02-07

## 2018-02-07 VITALS
TEMPERATURE: 98.3 F | WEIGHT: 124.13 LBS | BODY MASS INDEX: 21.99 KG/M2 | SYSTOLIC BLOOD PRESSURE: 104 MMHG | RESPIRATION RATE: 16 BRPM | DIASTOLIC BLOOD PRESSURE: 72 MMHG | OXYGEN SATURATION: 96 % | HEIGHT: 63 IN | HEART RATE: 65 BPM

## 2018-02-07 DIAGNOSIS — G44.219 EPISODIC TENSION-TYPE HEADACHE, NOT INTRACTABLE: Primary | ICD-10-CM

## 2018-02-07 DIAGNOSIS — F41.8 DEPRESSION WITH ANXIETY: ICD-10-CM

## 2018-02-07 RX ORDER — CLONAZEPAM 1 MG/1
1 TABLET ORAL 3 TIMES DAILY
Qty: 90 TAB | Refills: 1 | Status: SHIPPED | OUTPATIENT
Start: 2018-02-07 | End: 2018-04-04 | Stop reason: SDUPTHER

## 2018-02-07 RX ORDER — BACLOFEN 10 MG/1
10 TABLET ORAL 3 TIMES DAILY
Qty: 60 TAB | Refills: 1 | Status: SHIPPED | OUTPATIENT
Start: 2018-02-07 | End: 2018-04-11 | Stop reason: SDUPTHER

## 2018-02-07 NOTE — PROGRESS NOTES
1. Have you been to the ER, urgent care clinic since your last visit? Hospitalized since your last visit? No    2. Have you seen or consulted any other health care providers outside of the [address] [place] [address] since your last visit? Include any pap smears or colon screening.  No    Chief Complaint   Patient presents with    Follow-up     1 mo f/u, med ck

## 2018-02-07 NOTE — MR AVS SNAPSHOT
[address] [address] 
[phone] Patient: Budchitra Poag MRN: [id number] :1985 Visit Information Date & Time Provider Department Dept. Phone Encounter #  
 2018 10:15 AM Frederick Bravo, [address] [phone] [id number] Upcoming Health Maintenance Date Due Pneumococcal 19-64 Medium Risk (1 of 1 - PPSV23) 10/15/2005 DTaP/Tdap/Td series (1 - Tdap) 10/15/2007 PAP AKA CERVICAL CYTOLOGY 2016 Influenza Age 5 to Adult 2017 Allergies as of 2018  Review Complete On: 2018 By: Frederick Bravo NP Severity Noted Reaction Type Reactions Lortab [Hydrocodone-acetaminophen]  2010    Other (comments) FACIAL SWELLING Morphine  2016    Other (comments) Blisters on body - 
 Denies Medication Allergy/Reaction on 10/11/2017 Current Immunizations  Reviewed on 2014 Name Date Influenza Vaccine 2013 Influenza Vaccine Split 2012, 10/18/2011, 2010 Not reviewed this visit You Were Diagnosed With   
  
 Codes Comments Episodic tension-type headache, not intractable    -  Primary ICD-10-CM: S20.149 ICD-9-CM: 339.11 Depression with anxiety     ICD-10-CM: F41.8 ICD-9-CM: 300.4 Vitals BP Pulse Temp Resp Height(growth percentile) Weight(growth percentile) 104/72 65 98.3 °F (36.8 °C) (Oral) 16 5' 3\" (1.6 m) 124 lb 2 oz (56.3 kg) LMP SpO2 BMI OB Status Smoking Status 2018 (Approximate) 96% 21.99 kg/m2 Having regular periods Never Smoker Vitals History BMI and BSA Data Body Mass Index Body Surface Area  
 21.99 kg/m 2 1.58 m 2 Preferred Pharmacy [facility] Name Phone [phone] [phone] Your Updated Medication List  
  
   
 This list is accurate as of: 2/7/18 11:00 AM.  Always use your most recent med list.  
  
  
  
  
 baclofen 10 mg tablet Commonly known as:  LIORESAL Take 1 Tab by mouth three (3) times daily. As needed for tension headache  
  
 citalopram 40 mg tablet Commonly known as:  Mariburte Chasten Take 1 Tab by mouth daily. clonazePAM 1 mg tablet Commonly known as:  Rin Rebel Take 1 Tab by mouth three (3) times daily. As needed for anxiety - must last 30 days * dextroamphetamine-amphetamine 10 mg tablet Commonly known as:  ADDERALL Take 1 Tab (10 mg total) by mouth daily. - must last 30 days Max Daily Amount: 10 mg  
  
 * dextroamphetamine-amphetamine 10 mg tablet Commonly known as:  ADDERALL Take 1 Tab (10 mg total) by mouth daily. - must last 30 days Max Daily Amount: 10 mg  
  
 * dextroamphetamine-amphetamine 10 mg tablet Commonly known as:  ADDERALL Take 1 Tab (10 mg total) by mouth daily. - must last 30 days Max Daily Amount: 10 mg  
  
 * Notice: This list has 3 medication(s) that are the same as other medications prescribed for you. Read the directions carefully, and ask your doctor or other care provider to review them with you. Prescriptions Printed Refills  
 clonazePAM (KLONOPIN) 1 mg tablet 1 Sig: Take 1 Tab by mouth three (3) times daily. As needed for anxiety - must last 30 days Class: Print Route: Oral  
  
Prescriptions Sent to Pharmacy Refills  
 baclofen (LIORESAL) 10 mg tablet 1 Sig: Take 1 Tab by mouth three (3) times daily. As needed for tension headache Class: Normal  
 Pharmacy: [facility] [place] [address] [address] [facility] #: [phone] Route: Oral  
  
Introducing [facility]! [facility] introduces [employer] patient portal. Now you can access parts of your medical record, email your doctor's office, and request medication refills online.    
 
1. In your internet browser, go to [email or web address] 2. Click on the First Time User? Click Here link in the Sign In box. You will see the New Member Sign Up page. 3. Enter your Pureshield Access Code exactly as it appears below. You will not need to use this code after youve completed the sign-up process. If you do not sign up before the expiration date, you must request a new code. · Pureshield Access Code: [email or web address] Expires: 5/8/2018 10:57 AM 
 
4. Enter the last four digits of your Social Security Number (xxxx) and Date of Birth (mm/dd/yyyy) as indicated and click Submit. You will be taken to the next sign-up page. 5. Create a [employer] ID. This will be your Pureshield login ID and cannot be changed, so think of one that is secure and easy to remember. 6. Create a Pureshield password. You can change your password at any time. 7. Enter your Password Reset Question and Answer. This can be used at a later time if you forget your password. 8. Enter your e-mail address. You will receive e-mail notification when new information is available in [address] 9. Click Sign Up. You can now view and download portions of your medical record. 10. Click the Download Summary menu link to download a portable copy of your medical information. If you have questions, please visit the Frequently Asked Questions section of the Pureshield website. Remember, Pureshield is NOT to be used for urgent needs. For medical emergencies, dial 911. Now available from your iPhone and Android! Please provide this summary of care documentation to your next provider. Your primary care clinician is listed as Jose Guadalupe Dennis. If you have any questions after today's visit, please call [phone].

## 2018-02-08 NOTE — PROGRESS NOTES
HISTORY OF PRESENT ILLNESS  Glen Hernandez is a 33 y.o. female. HPI  Here for follow up anxiety  On klonopin bid, stressed with son's health  He is not growing, have 6 hours of labs tomorrow and MRI of the brain this week  Very worried and constant tremor noticeable during visit today  ADD med refills are still current    ROS  A comprehensive review of system was obtained and negative except findings in the HPI    Visit Vitals    /72    Pulse 65    Temp 98.3 °F (36.8 °C) (Oral)    Resp 16    Ht 5' 3\" (1.6 m)    Wt 124 lb 2 oz (56.3 kg)    LMP 01/23/2018 (Approximate)    SpO2 96%    BMI 21.99 kg/m2     Physical Exam   Constitutional: She is oriented to person, place, and time. She appears well-developed and well-nourished. Neck: No JVD present. Cardiovascular: Normal rate, regular rhythm and intact distal pulses. Exam reveals no gallop and no friction rub. No murmur heard. Pulmonary/Chest: Effort normal and breath sounds normal. No respiratory distress. She has no wheezes. Musculoskeletal: She exhibits no edema. Neurological: She is alert and oriented to person, place, and time. Skin: Skin is warm. Nursing note and vitals reviewed. ASSESSMENT and PLAN  Encounter Diagnoses   Name Primary?  Episodic tension-type headache, not intractable Yes    Depression with anxiety      Orders Placed This Encounter    clonazePAM (KLONOPIN) 1 mg tablet    baclofen (LIORESAL) 10 mg tablet     Given klonopin refill  Also given baclofen for chronic tension headaches  Reviewed how to use and adr/se of med  I have discussed the diagnosis with the patient and the intended plan as seen in the above orders. The patient has received an after-visit summary and questions were answered concerning future plans. Patient conveyed understanding of the plan at the time of the visit.     Matteo Barger, MSN, ANP  2/7/2018

## 2018-04-04 ENCOUNTER — OFFICE VISIT (OUTPATIENT)
Dept: FAMILY MEDICINE CLINIC | Age: 33
End: 2018-04-04

## 2018-04-04 VITALS
SYSTOLIC BLOOD PRESSURE: 126 MMHG | DIASTOLIC BLOOD PRESSURE: 86 MMHG | WEIGHT: 131 LBS | TEMPERATURE: 98.5 F | BODY MASS INDEX: 23.21 KG/M2 | HEIGHT: 63 IN | HEART RATE: 125 BPM | OXYGEN SATURATION: 98 % | RESPIRATION RATE: 19 BRPM

## 2018-04-04 DIAGNOSIS — F41.8 DEPRESSION WITH ANXIETY: ICD-10-CM

## 2018-04-04 DIAGNOSIS — F98.8 ATTENTION DEFICIT DISORDER, UNSPECIFIED HYPERACTIVITY PRESENCE: Primary | ICD-10-CM

## 2018-04-04 DIAGNOSIS — G43.009 ATYPICAL MIGRAINE: ICD-10-CM

## 2018-04-04 RX ORDER — DEXTROAMPHETAMINE SACCHARATE, AMPHETAMINE ASPARTATE, DEXTROAMPHETAMINE SULFATE AND AMPHETAMINE SULFATE 2.5; 2.5; 2.5; 2.5 MG/1; MG/1; MG/1; MG/1
10 TABLET ORAL DAILY
Qty: 30 TAB | Refills: 0 | Status: SHIPPED | OUTPATIENT
Start: 2018-04-04 | End: 2018-04-19 | Stop reason: SDUPTHER

## 2018-04-04 RX ORDER — CLONAZEPAM 1 MG/1
1 TABLET ORAL 3 TIMES DAILY
Qty: 90 TAB | Refills: 1 | Status: SHIPPED | OUTPATIENT
Start: 2018-04-04 | End: 2018-06-01 | Stop reason: SDUPTHER

## 2018-04-04 RX ORDER — DEXTROAMPHETAMINE SACCHARATE, AMPHETAMINE ASPARTATE, DEXTROAMPHETAMINE SULFATE AND AMPHETAMINE SULFATE 2.5; 2.5; 2.5; 2.5 MG/1; MG/1; MG/1; MG/1
10 TABLET ORAL DAILY
Qty: 30 TAB | Refills: 0 | Status: SHIPPED | OUTPATIENT
Start: 2018-04-04 | End: 2018-06-29 | Stop reason: SDUPTHER

## 2018-04-04 NOTE — MR AVS SNAPSHOT
[address] [address] 
[phone] Patient: Yamilka Wagoner MRN: [id number] :1985 Visit Information Date & Time Provider Department Dept. Phone Encounter #  
 2018  2:30 PM Dougie Saab NP [address] [facility] [phone] [id number] Upcoming Health Maintenance Date Due Pneumococcal 19-64 Medium Risk (1 of 1 - PPSV23) 10/15/2005 DTaP/Tdap/Td series (1 - Tdap) 10/15/2007 PAP AKA CERVICAL CYTOLOGY 2016 Influenza Age 5 to Adult 2017 MEDICARE YEARLY EXAM 3/14/2018 Allergies as of 2018  Review Complete On: 2018 By: Delfin Michele LPN Severity Noted Reaction Type Reactions Lortab [Hydrocodone-acetaminophen]  2010    Other (comments) FACIAL SWELLING Morphine  2016    Other (comments) Blisters on body - 
 Denies Medication Allergy/Reaction on 10/11/2017 Current Immunizations  Reviewed on 2014 Name Date Influenza Vaccine 2013 Influenza Vaccine Split 2012, 10/18/2011, 2010 Not reviewed this visit You Were Diagnosed With   
  
 Codes Comments Attention deficit disorder, unspecified hyperactivity presence    -  Primary ICD-10-CM: F98.8 ICD-9-CM: 314.00 Depression with anxiety     ICD-10-CM: F41.8 ICD-9-CM: 300.4 Atypical migraine     ICD-10-CM: U87.362 ICD-9-CM: 346.80 Vitals BP Pulse Temp Resp Height(growth percentile) Weight(growth percentile) 126/86 (BP 1 Location: Right arm, BP Patient Position: Sitting) (!) 125 98.5 °F (36.9 °C) (Oral) 19 5' 3\" (1.6 m) 131 lb (59.4 kg) SpO2 BMI OB Status Smoking Status 98% 23.21 kg/m2 Having regular periods Never Smoker Vitals History BMI and BSA Data Body Mass Index Body Surface Area  
 23.21 kg/m 2 1.62 m 2 Preferred Pharmacy Pharmacy Name Phone [phone] [phone] Your Updated Medication List  
  
   
This list is accurate as of 4/4/18  3:21 PM.  Always use your most recent med list.  
  
  
  
  
 baclofen 10 mg tablet Commonly known as:  LIORESAL Take 1 Tab by mouth three (3) times daily. As needed for tension headache  
  
 citalopram 40 mg tablet Commonly known as:  Mak Apt Take 1 Tab by mouth daily. clonazePAM 1 mg tablet Commonly known as:  Bobbi Gouty Take 1 Tab by mouth three (3) times daily. As needed for anxiety - must last 30 days * dextroamphetamine-amphetamine 10 mg tablet Commonly known as:  ADDERALL Take 1 Tab (10 mg total) by mouth daily. - must last 30 days Max Daily Amount: 10 mg  
  
 * dextroamphetamine-amphetamine 10 mg tablet Commonly known as:  ADDERALL Take 1 Tab (10 mg total) by mouth daily. - must last 30 days Max Daily Amount: 10 mg  
  
 * dextroamphetamine-amphetamine 10 mg tablet Commonly known as:  ADDERALL Take 1 Tab (10 mg total) by mouth daily. - must last 30 days Max Daily Amount: 10 mg  
  
 * Notice: This list has 3 medication(s) that are the same as other medications prescribed for you. Read the directions carefully, and ask your doctor or other care provider to review them with you. Prescriptions Printed Refills  
 dextroamphetamine-amphetamine (ADDERALL) 10 mg tablet 0 Sig: Take 1 Tab (10 mg total) by mouth daily. - must last 30 days Max Daily Amount: 10 mg  
 Class: Print Route: Oral  
 dextroamphetamine-amphetamine (ADDERALL) 10 mg tablet 0 Sig: Take 1 Tab (10 mg total) by mouth daily. - must last 30 days Max Daily Amount: 10 mg  
 Class: Print Route: Oral  
 dextroamphetamine-amphetamine (ADDERALL) 10 mg tablet 0 Sig: Take 1 Tab (10 mg total) by mouth daily. - must last 30 days Max Daily Amount: 10 mg  
 Class: Print  Route: Oral  
 clonazePAM (KLONOPIN) 1 mg tablet 1  
 Sig: Take 1 Tab by mouth three (3) times daily. As needed for anxiety - must last 30 days Class: Print Route: Oral  
  
We Performed the Following REFERRAL TO NEUROLOGY [RCY41 Custom] Referral Information Referral ID Referred By Referred To  
  
 [id number] [facility] [address] [place], [address] [facility] Phone: [phone] Fax: [phone] Visits Status Start Date End Date 1 New Request 4/4/18 4/4/19 If your referral has a status of pending review or denied, additional information will be sent to support the outcome of this decision. Introducing [facility]! [facility] introduces [email or web address] patient portal. Now you can access parts of your medical record, email your doctor's office, and request medication refills online. 1. In your internet browser, go to [email or web address] 2. Click on the First Time User? Click Here link in the Sign In box. You will see the New Member Sign Up page. 3. Enter your [email or web address] Access Code exactly as it appears below. You will not need to use this code after youve completed the sign-up process. If you do not sign up before the expiration date, you must request a new code. · [email or web address] Access Code: [email or web address] Expires: 5/8/2018 11:57 AM 
 
4. Enter the last four digits of your Social Security Number (xxxx) and Date of Birth (mm/dd/yyyy) as indicated and click Submit. You will be taken to the next sign-up page. 5. Create a Ultrivat ID. This will be your Ultrivat login ID and cannot be changed, so think of one that is secure and easy to remember. 6. Create a Ultrivat password. You can change your password at any time. 7. Enter your Password Reset Question and Answer. This can be used at a later time if you forget your password. 8. Enter your e-mail address. You will receive e-mail notification when new information is available in [address] 9. Click Sign Up. You can now view and download portions of your medical record. 10. Click the Download Summary menu link to download a portable copy of your medical information. If you have questions, please visit the Frequently Asked Questions section of the Just[employer] website. Remember, Just[employer] is NOT to be used for urgent needs. For medical emergencies, dial 911. Now available from your iPhone and Android! Please provide this summary of care documentation to your next provider. Your primary care clinician is listed as Jaciel Huffman. If you have any questions after today's visit, please call [phone].

## 2018-04-04 NOTE — PROGRESS NOTES
HISTORY OF PRESENT ILLNESS  Sara Guillermo is a 33 y.o. female. HPI  Patient returns to office for follow up ADD. Stable on medication without weight loss, decreased appetite, insomnia, or tremor. Good focus control. Gets three months of scripts at a time. She is managed on Adderall daily  She is also requesting refills of her clonazepam, taking bid as directed  No early refill requests    Wants to see neuro for migraine work up baclofen not helping anymore    ROS  A comprehensive review of system was obtained and negative except findings in the HPI    Visit Vitals    /86 (BP 1 Location: Right arm, BP Patient Position: Sitting)    Pulse (!) 125    Temp 98.5 °F (36.9 °C) (Oral)    Resp 19    Ht 5' 3\" (1.6 m)    Wt 131 lb (59.4 kg)    SpO2 98%    BMI 23.21 kg/m2     Physical Exam   Constitutional: She is oriented to person, place, and time. She appears well-developed and well-nourished. Neck: No JVD present. Cardiovascular: Normal rate, regular rhythm and intact distal pulses. Exam reveals no gallop and no friction rub. No murmur heard. Pulmonary/Chest: Effort normal and breath sounds normal. No respiratory distress. She has no wheezes. Musculoskeletal: She exhibits no edema. Neurological: She is alert and oriented to person, place, and time. Skin: Skin is warm. Nursing note and vitals reviewed. ASSESSMENT and PLAN  Encounter Diagnoses   Name Primary?     Attention deficit disorder, unspecified hyperactivity presence Yes    Depression with anxiety     Atypical migraine      Orders Placed This Encounter   Debo Ferguson Neurology ref [facility]    dextroamphetamine-amphetamine (ADDERALL) 10 mg tablet    dextroamphetamine-amphetamine (ADDERALL) 10 mg tablet    dextroamphetamine-amphetamine (ADDERALL) 10 mg tablet    clonazePAM (KLONOPIN) 1 mg tablet     Refills updated  Referral to Neuro given as well  Follow up 3 mo  I have discussed the diagnosis with the patient and the intended plan as seen in the above orders. The patient has received an after-visit summary and questions were answered concerning future plans. Patient conveyed understanding of the plan at the time of the visit.     Lashay Franco, MSN, ANP  4/4/2018

## 2018-04-11 ENCOUNTER — TELEPHONE (OUTPATIENT)
Dept: FAMILY MEDICINE CLINIC | Age: 33
End: 2018-04-11

## 2018-04-11 RX ORDER — BACLOFEN 10 MG/1
10 TABLET ORAL 3 TIMES DAILY
Qty: 60 TAB | Refills: 1 | Status: SHIPPED | OUTPATIENT
Start: 2018-04-11 | End: 2018-06-01 | Stop reason: SDUPTHER

## 2018-04-11 NOTE — TELEPHONE ENCOUNTER
----- Message from Pamela Chavez sent at 4/11/2018  2:50 PM EDT -----  Regarding: HERB Huitron/Telephone  Pt is stating that the Neurologist who was referred, is unavailable until a few weeks out, and is requesting a call, to figure out what she should do next. Best contact number [phone].

## 2018-04-19 ENCOUNTER — OFFICE VISIT (OUTPATIENT)
Dept: NEUROLOGY | Age: 33
End: 2018-04-19

## 2018-04-19 VITALS
HEIGHT: 63 IN | HEART RATE: 102 BPM | BODY MASS INDEX: 22.32 KG/M2 | DIASTOLIC BLOOD PRESSURE: 87 MMHG | RESPIRATION RATE: 16 BRPM | OXYGEN SATURATION: 91 % | SYSTOLIC BLOOD PRESSURE: 137 MMHG | WEIGHT: 126 LBS

## 2018-04-19 DIAGNOSIS — G43.009 MIGRAINE WITHOUT AURA AND WITHOUT STATUS MIGRAINOSUS, NOT INTRACTABLE: Primary | ICD-10-CM

## 2018-04-19 RX ORDER — TOPIRAMATE 25 MG/1
25 TABLET ORAL
Qty: 30 TAB | Refills: 6 | Status: SHIPPED | OUTPATIENT
Start: 2018-04-19 | End: 2018-12-14 | Stop reason: SDUPTHER

## 2018-04-19 RX ORDER — METHYLPREDNISOLONE 4 MG/1
TABLET ORAL
Qty: 1 DOSE PACK | Refills: 0 | Status: SHIPPED | OUTPATIENT
Start: 2018-04-19 | End: 2018-09-04 | Stop reason: ALTCHOICE

## 2018-04-19 RX ORDER — SUMATRIPTAN 100 MG/1
100 TABLET, FILM COATED ORAL
Qty: 12 TAB | Refills: 6 | Status: SHIPPED | OUTPATIENT
Start: 2018-04-19 | End: 2020-07-13 | Stop reason: ALTCHOICE

## 2018-04-19 RX ORDER — ONDANSETRON 4 MG/1
4 TABLET, ORALLY DISINTEGRATING ORAL
Qty: 12 TAB | Refills: 1 | Status: SHIPPED | OUTPATIENT
Start: 2018-04-19 | End: 2020-07-13 | Stop reason: ALTCHOICE

## 2018-04-19 NOTE — MR AVS SNAPSHOT
[address] [address] [id number] [address] 
[phone] Patient: Robert Ivey MRN: [id number] :1985 Visit Information Date & Time Provider Department Dept. Phone Encounter #  
 2018 10:00 AM Aracely Keller MD [address] [facility] [phone] [id number] Follow-up Instructions Return in about 6 weeks (around 2018). Follow-up and Disposition History Upcoming Health Maintenance Date Due Pneumococcal 19-64 Medium Risk (1 of 1 - PPSV23) 10/15/2005 DTaP/Tdap/Td series (1 - Tdap) 10/15/2007 PAP AKA CERVICAL CYTOLOGY 2016 Influenza Age 5 to Adult 2017 MEDICARE YEARLY EXAM 3/14/2018 Allergies as of 2018  Review Complete On: 2018 By: Aracely Keller MD  
  
 Severity Noted Reaction Type Reactions Lortab [Hydrocodone-acetaminophen]  2010    Other (comments) FACIAL SWELLING Morphine  2016    Other (comments) Blisters on body - 
 Denies Medication Allergy/Reaction on 10/11/2017 Current Immunizations  Reviewed on 2014 Name Date Influenza Vaccine 2013 Influenza Vaccine Split 2012, 10/18/2011, 2010 Not reviewed this visit You Were Diagnosed With   
  
 Codes Comments Migraine without aura and without status migrainosus, not intractable    -  Primary ICD-10-CM: G43.009 ICD-9-CM: 346.10 Vitals BP Pulse Resp Height(growth percentile) Weight(growth percentile) LMP  
 137/87 (!) 102 16 5' 3\" (1.6 m) 126 lb (57.2 kg) 2018 (Exact Date) SpO2 BMI OB Status Smoking Status 91% 22.32 kg/m2 Having regular periods Never Smoker BMI and BSA Data Body Mass Index Body Surface Area  
 22.32 kg/m 2 1.59 m 2 Preferred Pharmacy Pharmacy Name Phone [id number] Kamryn aJckman [phone] Your Updated Medication List  
  
   
This list is accurate as of 4/19/18 11:05 AM.  Always use your most recent med list.  
  
  
  
  
 baclofen 10 mg tablet Commonly known as:  LIORESAL Take 1 Tab by mouth three (3) times daily. As needed for tension headache  
  
 citalopram 40 mg tablet Commonly known as:  Hamzah Felipe Take 1 Tab by mouth daily. clonazePAM 1 mg tablet Commonly known as:  Luigiely Hack Take 1 Tab by mouth three (3) times daily. As needed for anxiety - must last 30 days  
  
 dextroamphetamine-amphetamine 10 mg tablet Commonly known as:  ADDERALL Take 1 Tab (10 mg total) by mouth daily. - must last 30 days Max Daily Amount: 10 mg  
  
 methylPREDNISolone 4 mg tablet Commonly known as:  Blinda Creed Per package instructions. ondansetron 4 mg disintegrating tablet Commonly known as:  ZOFRAN ODT Take 1 Tab by mouth every eight (8) hours as needed for Nausea. SUMAtriptan 100 mg tablet Commonly known as:  IMITREX Take 1 Tab by mouth once as needed for Migraine for up to 1 dose. Indications: Migraine  
  
 topiramate 25 mg tablet Commonly known as:  TOPAMAX Take 1 Tab by mouth nightly. Indications: MIGRAINE PREVENTION Prescriptions Sent to Pharmacy Refills  
 methylPREDNISolone (MEDROL DOSEPACK) 4 mg tablet 0 Sig: Per package instructions. Class: Normal  
 [facility]: [id number] [address] [place] [address] Ph #: [phone]  
 topiramate (TOPAMAX) 25 mg tablet 6 Sig: Take 1 Tab by mouth nightly. Indications: MIGRAINE PREVENTION Class: Normal  
 [id number] [address] [place] [address] Ph #: [phone] Route: Oral  
 SUMAtriptan (IMITREX) 100 mg tablet 6 Sig: Take 1 Tab by mouth once as needed for Migraine for up to 1 dose. Indications: Migraine  Class: Normal  
 Pharmacy: [id number] [address] [place] [address] [address]  #: [phone] Route: Oral  
 ondansetron (ZOFRAN ODT) 4 mg disintegrating tablet 1 Sig: Take 1 Tab by mouth every eight (8) hours as needed for Nausea. Class: Normal  
 Pharmacy: [id number] [address] [place] [address], [address] [place] [address] Ph #: [phone] Route: Oral  
  
Follow-up Instructions Return in about 6 weeks (around 5/31/2018). Patient Instructions Patient history reviewed and patient examined. These sound like migraines and given the number and intensity propose using a preventative drug at night and be patient as it builds up in the system. Imitrex will be the rescue drug which can be used at moment 0 with an over-the-counter Aleve or ibuprofen for potentially a better rescue effect. Will also dispense a Medrol Dosepak which may help rush improvement. Minimize stress get good sleep and eventually using some positive physical distractions such as walking or going to the gym may help as well. Exam looks normal. 
 
 
 
PRESCRIPTION REFILL POLICY [facility] Statement to Patients April 1, 2014 In an effort to ensure the large volume of patient prescription refills is processed in the most efficient and expeditious manner, we are asking our patients to assist us by calling your Pharmacy for all prescription refills, this will include also your  Mail Order Pharmacy. The pharmacy will contact our office electronically to continue the refill process. Please do not wait until the last minute to call your pharmacy. We need at least 48 hours (2days) to fill prescriptions. We also encourage you to call your pharmacy before going to  your prescription to make sure it is ready. With regard to controlled substance prescription refill requests (narcotic refills) that need to be picked up at our office, we ask your cooperation by providing us with at least 72 hours (3days) notice that you will need a refill. We will not refill narcotic prescription refill requests after 4:00pm on any weekday, Monday through Thursday, or after 2:00pm on Fridays, or on the weekends. We encourage everyone to explore another way of getting your prescription refill request processed using [employer], our patient web portal through our electronic medical record system. [employer] is an efficient and effective way to communicate your medication request directly to the office and  downloadable as an beata on your smart phone . [employer] also features a review functionality that allows you to view your medication list as well as leave messages for your physician. Are you ready to get connected? If so please review the attatched instructions or speak to any of our staff to get you set up right away! Thank you so much for your cooperation. Should you have any questions please contact our Practice Administrator. The Physicians and Staff,  [facility] Theemaria esther [address] What is a living will? A living will is a legal form you use to write down the kind of care you want at the end of your life. It is used by the health professionals who will treat you if you aren't able to decide for yourself. If you put your wishes in writing, your loved ones and others will know what kind of care you want. They won't need to guess. This can ease your mind and be helpful to others. A living will is not the same as an estate or property will. An estate will explains what you want to happen with your money and property after you die. Is a living will a legal document? A living will is a legal document. Each state has its own laws about living negron. If you move to another state, make sure that your living will is legal in the state where you now live. Or you might use a universal form that has been approved by many states. This kind of form can sometimes be completed and stored online.  Your electronic copy will then be available wherever you have a connection to the Internet. In most cases, doctors will respect your wishes even if you have a form from a different state. · You don't need an  to complete a living will. But legal advice can be helpful if your state's laws are unclear, your health history is complicated, or your family can't agree on what should be in your living will. · You can change your living will at any time. Some people find that their wishes about end-of-life care change as their health changes. · In addition to making a living will, think about completing a medical power of  form. This form lets you name the person you want to make end-of-life treatment decisions for you (your \"health care agent\") if you're not able to. Many hospitals and nursing homes will give you the forms you need to complete a living will and a medical power of . · Your living will is used only if you can't make or communicate decisions for yourself anymore. If you become able to make decisions again, you can accept or refuse any treatment, no matter what you wrote in your living will. · Your state may offer an online registry. This is a place where you can store your living will online so the doctors and nurses who need to treat you can find it right away. What should you think about when creating a living will? Talk about your end-of-life wishes with your family members and your doctor. Let them know what you want. That way the people making decisions for you won't be surprised by your choices. Think about these questions as you make your living will: · Do you know enough about life support methods that might be used? If not, talk to your doctor so you know what might be done if you can't breathe on your own, your heart stops, or you're unable to swallow.  
· What things would you still want to be able to do after you receive life-support methods? Would you want to be able to walk? To speak? To eat on your own? To live without the help of machines? · If you have a choice, where do you want to be cared for? In your home? At a hospital or nursing home? · Do you want certain Christianity practices performed if you become very ill? · If you have a choice at the end of your life, where would you prefer to die? At home? In a hospital or nursing home? Somewhere else? · Would you prefer to be buried or cremated? · Do you want your organs to be donated after you die? What should you do with your living will? · Make sure that your family members and your health care agent have copies of your living will. · Give your doctor a copy of your living will to keep in your medical record. If you have more than one doctor, make sure that each one has a copy. · You may want to put a copy of your living will where it can be easily found. Where can you learn more? Go to http://beni-jasmine.info/. Enter U864 in the search box to learn more about \"Learning About Living Perroy. \" Current as of: September 24, 2016 Content Version: 11.4 © 2585-8852 [employer]. Care instructions adapted under license by [employer] (which disclaims liability or warranty for this information). If you have questions about a medical condition or this instruction, always ask your healthcare professional. [address] [place] [address] any warranty or liability for your use of this information. Patient history reviewed and patient examined. Sound like migraine headaches and given the characteristics will issue Topamax at night as a preventative drug and need to be patient as it builds up in the system. Imitrex tablets will be a rescue drug which can be used at moment 0 with over-the-counter Aleve or ibuprofen for potential additive effect.   Medrol Dosepak will be issued as a fast rescue drug and Zofran as needed for nausea and vomiting. Minimize stress get good sleep and eventually good distractions such as walking or going to the gym and exercising may certainly help. Exam looks normal. Continue to monitor headaches to increase self awareness. Patient Instructions History Introducing [facility]! Carmenbhavesh Dysons introduces [employer] patient portal. Now you can access parts of your medical record, email your doctor's office, and request medication refills online. 1. In your internet browser, go to [email or web address]://"[email or web address]". Petta/"[employer]" 2. Click on the First Time User? Click Here link in the Sign In box. You will see the New Member Sign Up page. 3. Enter your HandMinder Access Code exactly as it appears below. You will not need to use this code after youve completed the sign-up process. If you do not sign up before the expiration date, you must request a new code. · HandMinder Access Code: [email or web address] Expires: 5/8/2018 11:57 AM 
 
4. Enter the last four digits of your Social Security Number (xxxx) and Date of Birth (mm/dd/yyyy) as indicated and click Submit. You will be taken to the next sign-up page. 5. Create a HandMinder ID. This will be your HandMinder login ID and cannot be changed, so think of one that is secure and easy to remember. 6. Create a [employer]Minder password. You can change your password at any time. 7. Enter your Password Reset Question and Answer. This can be used at a later time if you forget your password. 8. Enter your e-mail address. You will receive e-mail notification when new information is available in [address] 9. Click Sign Up. You can now view and download portions of your medical record. 10. Click the Download Summary menu link to download a portable copy of your medical information. If you have questions, please visit the Frequently Asked Questions section of the HandMinder website. Remember, HandMinder is NOT to be used for urgent needs. For medical emergencies, dial 911. Now available from your iPhone and Android! Please provide this summary of care documentation to your next provider. Your primary care clinician is listed as Blondie Both. If you have any questions after today's visit, please call [phone].

## 2018-04-19 NOTE — PATIENT INSTRUCTIONS
Patient history reviewed and patient examined. These sound like migraines and given the number and intensity propose using a preventative drug at night and be patient as it builds up in the system. Imitrex will be the rescue drug which can be used at moment 0 with an over-the-counter Aleve or ibuprofen for potentially a better rescue effect. Will also dispense a Medrol Dosepak which may help rush improvement. Minimize stress get good sleep and eventually using some positive physical distractions such as walking or going to the gym may help as well. Exam looks normal.        [facility]   Statement to Patients  April 1, 2014      In an effort to ensure the large volume of patient prescription refills is processed in the most efficient and expeditious manner, we are asking our patients to assist us by calling your Pharmacy for all prescription refills, this will include also your  Mail Order Pharmacy. The pharmacy will contact our office electronically to continue the refill process. Please do not wait until the last minute to call your pharmacy. We need at least 48 hours (2days) to fill prescriptions. We also encourage you to call your pharmacy before going to  your prescription to make sure it is ready. With regard to controlled substance prescription refill requests (narcotic refills) that need to be picked up at our office, we ask your cooperation by providing us with at least 72 hours (3days) notice that you will need a refill. We will not refill narcotic prescription refill requests after 4:00pm on any weekday, Monday through Thursday, or after 2:00pm on Fridays, or on the weekends. We encourage everyone to explore another way of getting your prescription refill request processed using [employer], our patient web portal through our electronic medical record system.  [employer] is an efficient and effective way to communicate your medication request directly to the office and  downloadable as an beata on your smart phone . [employer] also features a review functionality that allows you to view your medication list as well as leave messages for your physician. Are you ready to get connected? If so please review the attatched instructions or speak to any of our staff to get you set up right away! Thank you so much for your cooperation. Should you have any questions please contact our Practice Administrator. The Physicians and Staff,  [facility] Rasheed Ordonez  What is a living will? A living will is a legal form you use to write down the kind of care you want at the end of your life. It is used by the health professionals who will treat you if you aren't able to decide for yourself. If you put your wishes in writing, your loved ones and others will know what kind of care you want. They won't need to guess. This can ease your mind and be helpful to others. A living will is not the same as an estate or property will. An estate will explains what you want to happen with your money and property after you die. Is a living will a legal document? A living will is a legal document. Each state has its own laws about living negron. If you move to another state, make sure that your living will is legal in the state where you now live. Or you might use a universal form that has been approved by many states. This kind of form can sometimes be completed and stored online. Your electronic copy will then be available wherever you have a connection to the Internet. In most cases, doctors will respect your wishes even if you have a form from a different state. · You don't need an  to complete a living will. But legal advice can be helpful if your state's laws are unclear, your health history is complicated, or your family can't agree on what should be in your living will.   · You can change your living will at any time. Some people find that their wishes about end-of-life care change as their health changes. · In addition to making a living will, think about completing a medical power of  form. This form lets you name the person you want to make end-of-life treatment decisions for you (your \"health care agent\") if you're not able to. Many hospitals and nursing homes will give you the forms you need to complete a living will and a medical power of . · Your living will is used only if you can't make or communicate decisions for yourself anymore. If you become able to make decisions again, you can accept or refuse any treatment, no matter what you wrote in your living will. · Your state may offer an online registry. This is a place where you can store your living will online so the doctors and nurses who need to treat you can find it right away. What should you think about when creating a living will? Talk about your end-of-life wishes with your family members and your doctor. Let them know what you want. That way the people making decisions for you won't be surprised by your choices. Think about these questions as you make your living will:  · Do you know enough about life support methods that might be used? If not, talk to your doctor so you know what might be done if you can't breathe on your own, your heart stops, or you're unable to swallow. · What things would you still want to be able to do after you receive life-support methods? Would you want to be able to walk? To speak? To eat on your own? To live without the help of machines? · If you have a choice, where do you want to be cared for? In your home? At a hospital or nursing home? · Do you want certain Advent practices performed if you become very ill? · If you have a choice at the end of your life, where would you prefer to die? At home? In a hospital or nursing home? Somewhere else? · Would you prefer to be buried or cremated?   · Do you want your organs to be donated after you die? What should you do with your living will? · Make sure that your family members and your health care agent have copies of your living will. · Give your doctor a copy of your living will to keep in your medical record. If you have more than one doctor, make sure that each one has a copy. · You may want to put a copy of your living will where it can be easily found. Where can you learn more? Go to http://beni-jasmine.info/. Enter E129 in the search box to learn more about \"Learning About Living Perroy. \"  Current as of: September 24, 2016  Content Version: 11.4  © 6821-2873 [employer]. Care instructions adapted under license by [employer] (which disclaims liability or warranty for this information). If you have questions about a medical condition or this instruction, always ask your healthcare professional. [place] [address] any warranty or liability for your use of this information. Patient history reviewed and patient examined. Sound like migraine headaches and given the characteristics will issue Topamax at night as a preventative drug and need to be patient as it builds up in the system. Imitrex tablets will be a rescue drug which can be used at moment 0 with over-the-counter Aleve or ibuprofen for potential additive effect. Medrol Dosepak will be issued as a fast rescue drug and Zofran as needed for nausea and vomiting. Minimize stress get good sleep and eventually good distractions such as walking or going to the gym and exercising may certainly help. Exam looks normal. Continue to monitor headaches to increase self awareness.

## 2018-04-19 NOTE — PROGRESS NOTES
Neurology Consult      Subjective:      Dao Esparza is a 33 y.o. female who comes in on first encounter with the following headache story. 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 Says she has had headaches all her life but it become daily in the last 2 months. Says that she is under stress all the time as she is responsible for 4 children. Says the headaches come and go and they're fast onset. Says she is definitely not an injectable person so oral therapies will be discussed. These are bitemporal to bifrontal headaches throbbing with nausea and vomiting sometimes can last for hours. Enhanced by light and noise and sometimes smells. Diminished by lying down. Family history is positive for her mother with headaches. Sleep currently is poor. In terms of neuro deficits sometimes complains that her hands have a numbness quality with her headaches. Sometimes her heart feels like it is racing and is on Adderall for ADHD and believes sometimes this medicine helps promote a headache. Has had no imaging for her headaches. Monthly cycles normal and no association with headaches. Currently is not breast-feeding. Baclofen was tried by primary care but did not help. Says she is looking to getting to the gym to make time for herself. Current Outpatient Prescriptions   Medication Sig Dispense Refill    methylPREDNISolone (MEDROL DOSEPACK) 4 mg tablet Per package instructions. 1 Dose Pack 0    topiramate (TOPAMAX) 25 mg tablet Take 1 Tab by mouth nightly. Indications: MIGRAINE PREVENTION 30 Tab 6    SUMAtriptan (IMITREX) 100 mg tablet Take 1 Tab by mouth once as needed for Migraine for up to 1 dose. Indications: Migraine 12 Tab 6    baclofen (LIORESAL) 10 mg tablet Take 1 Tab by mouth three (3) times daily. As needed for tension headache 60 Tab 1    dextroamphetamine-amphetamine (ADDERALL) 10 mg tablet Take 1 Tab (10 mg total) by mouth daily.   - must last 30 days Max Daily Amount: 10 mg (Patient taking differently: Take 20 mg by mouth daily. - must last 30 days      ) 30 Tab 0    clonazePAM (KLONOPIN) 1 mg tablet Take 1 Tab by mouth three (3) times daily. As needed for anxiety - must last 30 days 90 Tab 1    citalopram (CELEXA) 40 mg tablet Take 1 Tab by mouth daily. 30 Tab 5      Allergies   Allergen Reactions    Lortab [Hydrocodone-Acetaminophen] Other (comments)     FACIAL SWELLING    Morphine Other (comments)     Blisters on body -   Denies Medication Allergy/Reaction on 10/11/2017     Past Medical History:   Diagnosis Date         ADD (attention deficit disorder) 2010    Depression     Depression with anxiety 2010    ETOH abuse 2010    Headache     HPV (human papillomavirus) 2010    Musculoskeletal disorder     Narcotic abuse 2010    [facility]      Past Surgical History:   Procedure Laterality Date    HX TUBAL LIGATION  2016      Social History     Social History    Marital status: SINGLE     Spouse name: N/A    Number of children: N/A    Years of education: N/A     Occupational History    Not on file. Social History Main Topics    Smoking status: Never Smoker    Smokeless tobacco: Never Used    Alcohol use Yes    Drug use: Yes    Sexual activity: Yes     Partners: Male     Birth control/ protection: Surgical     Other Topics Concern    Not on file     Social History Narrative      Family History   Problem Relation Age of Onset    Stroke Mother     Cancer Mother     Diabetes Father     Cancer Father       There were no vitals taken for this visit. Review of Systems:   A comprehensive review of systems was negative except for that written in the HPI. Neuro Exam:     Appearance: The patient is well developed, well nourished, provides a coherent history and is in no acute distress. Mental Status: Oriented to time, place and person. Mood and affect appropriate.    Cranial Nerves: Intact visual fields. Fundi are benign but photophobic. SOHAIL, EOM's full, no nystagmus, no ptosis. Facial sensation is normal. Corneal reflexes are intact. Facial movement is symmetric. Hearing is normal bilaterally. Palate is midline with normal sternocleidomastoid and trapezius muscles are normal. Tongue is midline. Motor:  5/5 strength in upper and lower proximal and distal muscles. Normal bulk and tone. No fasciculations. Reflexes:   Deep tendon reflexes 2+/4 and symmetrical.   Sensory:   Normal to touch, pinprick and vibration. Gait:  Normal gait. Tremor:   No tremor noted. Cerebellar:  No cerebellar signs present. Neurovascular:  Normal heart sounds and regular rhythm, peripheral pulses intact, and no carotid bruits. Assessment:   Migraine headaches. This is my best label on first encounter and exam looks normal.  Given the characteristics would like to intervene with Topamax 25 mg nightly and she has been warned to be patient. Imitrex 100 mg with a rescue drug which can be used a moment 0 with over-the-counter remedies if and as needed. Will dispense Medrol Dosepak as a quick rescue agent. Getting good sleep minimizing stress would be helpful hence. Also positive physical distraction such as walking and/or going to the gym may help by degrees. Exam is normal. zofran 4 mgm for N/V prn. Plan:   Revisit in about 6 weeks.   Signed by :  Piero Lou MD

## 2018-06-01 ENCOUNTER — OFFICE VISIT (OUTPATIENT)
Dept: FAMILY MEDICINE CLINIC | Age: 33
End: 2018-06-01

## 2018-06-01 VITALS
TEMPERATURE: 98.5 F | HEIGHT: 63 IN | BODY MASS INDEX: 22.53 KG/M2 | WEIGHT: 127.13 LBS | DIASTOLIC BLOOD PRESSURE: 64 MMHG | OXYGEN SATURATION: 99 % | HEART RATE: 80 BPM | RESPIRATION RATE: 16 BRPM | SYSTOLIC BLOOD PRESSURE: 102 MMHG

## 2018-06-01 DIAGNOSIS — F98.8 ATTENTION DEFICIT DISORDER, UNSPECIFIED HYPERACTIVITY PRESENCE: ICD-10-CM

## 2018-06-01 DIAGNOSIS — F41.8 DEPRESSION WITH ANXIETY: ICD-10-CM

## 2018-06-01 RX ORDER — CITALOPRAM 20 MG/1
20 TABLET, FILM COATED ORAL DAILY
Qty: 30 TAB | Refills: 5 | Status: SHIPPED | OUTPATIENT
Start: 2018-06-01 | End: 2018-09-04 | Stop reason: SDUPTHER

## 2018-06-01 RX ORDER — CLONAZEPAM 1 MG/1
1 TABLET ORAL 3 TIMES DAILY
Qty: 90 TAB | Refills: 1 | Status: SHIPPED | OUTPATIENT
Start: 2018-06-01 | End: 2018-07-23 | Stop reason: SDUPTHER

## 2018-06-01 RX ORDER — BACLOFEN 10 MG/1
10 TABLET ORAL 3 TIMES DAILY
Qty: 60 TAB | Refills: 1 | Status: SHIPPED | OUTPATIENT
Start: 2018-06-01 | End: 2018-07-23 | Stop reason: SDUPTHER

## 2018-06-01 NOTE — PROGRESS NOTES
1. Have you been to the ER, urgent care clinic since your last visit? Hospitalized since your last visit? No    2. Have you seen or consulted any other health care providers outside of the [address] [place] [address] since your last visit? Include any pap smears or colon screening.  No    Chief Complaint   Patient presents with    Follow-up     1 mo f/u med ck    Medication Refill

## 2018-06-01 NOTE — MR AVS SNAPSHOT
[address] [address] 
[phone] Patient: Johanna Castle MRN: [id number] :1985 Visit Information Date & Time Provider Department Dept. Phone Encounter #  
 2018  4:00 PM Jaswinder Pedroza S Mateusz Gama [phone] [id number] Upcoming Health Maintenance Date Due Pneumococcal 19-64 Medium Risk (1 of 1 - PPSV23) 10/15/2005 DTaP/Tdap/Td series (1 - Tdap) 10/15/2007 PAP AKA CERVICAL CYTOLOGY 2016 MEDICARE YEARLY EXAM 3/14/2018 Influenza Age 5 to Adult 2018 Allergies as of 2018  Review Complete On: 2018 By: Maria Luisa Pereyra LPN Severity Noted Reaction Type Reactions Lortab [Hydrocodone-acetaminophen]  2010    Other (comments) FACIAL SWELLING Morphine  2016    Other (comments) Blisters on body - 
 Denies Medication Allergy/Reaction on 10/11/2017 Current Immunizations  Reviewed on 2014 Name Date Influenza Vaccine 2013 Influenza Vaccine Split 2012, 10/18/2011, 2010 Not reviewed this visit You Were Diagnosed With   
  
 Codes Comments Attention deficit disorder, unspecified hyperactivity presence     ICD-10-CM: F98.8 ICD-9-CM: 314.00 Depression with anxiety     ICD-10-CM: F41.8 ICD-9-CM: 300.4 Vitals BP Pulse Temp Resp Height(growth percentile) Weight(growth percentile) 102/64 80 98.5 °F (36.9 °C) (Oral) 16 5' 3\" (1.6 m) 127 lb 2 oz (57.7 kg) LMP SpO2 BMI OB Status Smoking Status 2018 (Approximate) 99% 22.52 kg/m2 Having regular periods Never Smoker Vitals History BMI and BSA Data Body Mass Index Body Surface Area  
 22.52 kg/m 2 1.6 m 2 Preferred Pharmacy [facility] Phone [id number] [address] [phone] Your Updated Medication List  
  
   
 This list is accurate as of 6/1/18  4:20 PM.  Always use your most recent med list.  
  
  
  
  
 baclofen 10 mg tablet Commonly known as:  LIORESAL Take 1 Tab by mouth three (3) times daily. As needed for tension headache  
  
 citalopram 20 mg tablet Commonly known as:  Suzen Ehrhardt Take 1 Tab by mouth daily. clonazePAM 1 mg tablet Commonly known as:  Erven Level Take 1 Tab by mouth three (3) times daily. As needed for anxiety - must last 30 days  
  
 dextroamphetamine-amphetamine 10 mg tablet Commonly known as:  ADDERALL Take 1 Tab (10 mg total) by mouth daily. - must last 30 days Max Daily Amount: 10 mg  
  
 methylPREDNISolone 4 mg tablet Commonly known as:  Michael Sales Per package instructions. ondansetron 4 mg disintegrating tablet Commonly known as:  ZOFRAN ODT Take 1 Tab by mouth every eight (8) hours as needed for Nausea. SUMAtriptan 100 mg tablet Commonly known as:  IMITREX Take 1 Tab by mouth once as needed for Migraine for up to 1 dose. Indications: Migraine  
  
 topiramate 25 mg tablet Commonly known as:  TOPAMAX Take 1 Tab by mouth nightly. Indications: MIGRAINE PREVENTION Prescriptions Printed Refills  
 citalopram (CELEXA) 20 mg tablet 5 Sig: Take 1 Tab by mouth daily. Class: Print Route: Oral  
 clonazePAM (KLONOPIN) 1 mg tablet 1 Sig: Take 1 Tab by mouth three (3) times daily. As needed for anxiety - must last 30 days Class: Print Route: Oral  
 baclofen (LIORESAL) 10 mg tablet 1 Sig: Take 1 Tab by mouth three (3) times daily. As needed for tension headache Class: Print Route: Oral  
  
Introducing [facility] & HEALTH SERVICES! [facility] introduces [employer] patient portal. Now you can access parts of your medical record, email your doctor's office, and request medication refills online. 1. In your internet browser, go to [email or web address] 2. Click on the First Time User? Click Here link in the Sign In box. You will see the New Member Sign Up page. 3. Enter your EyeEm Access Code exactly as it appears below. You will not need to use this code after youve completed the sign-up process. If you do not sign up before the expiration date, you must request a new code. · EyeEm Access Code: [email or web address] Expires: 8/30/2018  4:20 PM 
 
4. Enter the last four digits of your Social Security Number (xxxx) and Date of Birth (mm/dd/yyyy) as indicated and click Submit. You will be taken to the next sign-up page. 5. Create a EyeEm ID. This will be your EyeEm login ID and cannot be changed, so think of one that is secure and easy to remember. 6. Create a EyeEm password. You can change your password at any time. 7. Enter your Password Reset Question and Answer. This can be used at a later time if you forget your password. 8. Enter your e-mail address. You will receive e-mail notification when new information is available in [address] 9. Click Sign Up. You can now view and download portions of your medical record. 10. Click the Download Summary menu link to download a portable copy of your medical information. If you have questions, please visit the Frequently Asked Questions section of the EyeEm website. Remember, EyeEm is NOT to be used for urgent needs. For medical emergencies, dial 911. Now available from your iPhone and Android! Please provide this summary of care documentation to your next provider. Your primary care clinician is listed as Eduardo Nguyen. If you have any questions after today's visit, please call [phone].

## 2018-06-04 NOTE — PROGRESS NOTES
HISTORY OF PRESENT ILLNESS  Collette Bank is a 33 y.o. female. HPI  Patient returns to office for follow up ADD. Stable on medication without weight loss, decreased appetite, insomnia, or tremor. Good focus control. Gets three months of scripts at a time. She is managed on Adderall. She is also managed on klonopin, celexa and baclofen for anxiety/stress and tension headaches  Needs updated Rx's on just these meds today  No adr/se of med and taking daily as prescribed    ROS  A comprehensive review of system was obtained and negative except findings in the HPI    Visit Vitals    /64    Pulse 80    Temp 98.5 °F (36.9 °C) (Oral)    Resp 16    Ht 5' 3\" (1.6 m)    Wt 127 lb 2 oz (57.7 kg)    LMP 05/04/2018 (Approximate)    SpO2 99%    BMI 22.52 kg/m2     Physical Exam   Constitutional: She is oriented to person, place, and time. She appears well-developed and well-nourished. Neck: No JVD present. Cardiovascular: Normal rate, regular rhythm and intact distal pulses. Exam reveals no gallop and no friction rub. No murmur heard. Pulmonary/Chest: Effort normal and breath sounds normal. No respiratory distress. She has no wheezes. Musculoskeletal: She exhibits no edema. Neurological: She is alert and oriented to person, place, and time. Skin: Skin is warm. Nursing note and vitals reviewed. ASSESSMENT and PLAN  Encounter Diagnoses   Name Primary?  Attention deficit disorder, unspecified hyperactivity presence     Depression with anxiety      Orders Placed This Encounter    citalopram (CELEXA) 20 mg tablet    clonazePAM (KLONOPIN) 1 mg tablet    baclofen (LIORESAL) 10 mg tablet     Refills updated today  Follow up 1 mo  Adr/se reviewed    I have discussed the diagnosis with the patient and the intended plan as seen in the above orders. The patient has received an after-visit summary and questions were answered concerning future plans.  Patient conveyed understanding of the plan at the time of the visit.     Rupa Joshua, MSN, ANP  6/4/2018

## 2018-07-23 ENCOUNTER — OFFICE VISIT (OUTPATIENT)
Dept: FAMILY MEDICINE CLINIC | Age: 33
End: 2018-07-23

## 2018-07-23 VITALS
BODY MASS INDEX: 21.26 KG/M2 | OXYGEN SATURATION: 100 % | HEIGHT: 63 IN | TEMPERATURE: 97.8 F | SYSTOLIC BLOOD PRESSURE: 103 MMHG | RESPIRATION RATE: 18 BRPM | DIASTOLIC BLOOD PRESSURE: 73 MMHG | WEIGHT: 120 LBS | HEART RATE: 99 BPM

## 2018-07-23 DIAGNOSIS — F25.0 SCHIZOAFFECTIVE DISORDER, BIPOLAR TYPE (HCC): ICD-10-CM

## 2018-07-23 DIAGNOSIS — F41.8 DEPRESSION WITH ANXIETY: ICD-10-CM

## 2018-07-23 DIAGNOSIS — F98.8 ATTENTION DEFICIT DISORDER, UNSPECIFIED HYPERACTIVITY PRESENCE: Primary | ICD-10-CM

## 2018-07-23 PROBLEM — F32.2 SEVERE DEPRESSION (HCC): Status: ACTIVE | Noted: 2018-07-23

## 2018-07-23 RX ORDER — CLONAZEPAM 1 MG/1
1 TABLET ORAL 3 TIMES DAILY
Qty: 90 TAB | Refills: 1 | Status: SHIPPED | OUTPATIENT
Start: 2018-07-23 | End: 2018-09-04 | Stop reason: SDUPTHER

## 2018-07-23 RX ORDER — BACLOFEN 10 MG/1
10 TABLET ORAL 3 TIMES DAILY
Qty: 60 TAB | Refills: 1 | Status: SHIPPED | OUTPATIENT
Start: 2018-07-23 | End: 2018-09-04 | Stop reason: SDUPTHER

## 2018-07-23 RX ORDER — DEXTROAMPHETAMINE SACCHARATE, AMPHETAMINE ASPARTATE, DEXTROAMPHETAMINE SULFATE AND AMPHETAMINE SULFATE 2.5; 2.5; 2.5; 2.5 MG/1; MG/1; MG/1; MG/1
10 TABLET ORAL DAILY
Qty: 30 TAB | Refills: 0 | Status: SHIPPED | OUTPATIENT
Start: 2018-07-23 | End: 2018-10-23 | Stop reason: SDUPTHER

## 2018-07-23 NOTE — MR AVS SNAPSHOT
[address] [address] 
[phone] Patient: Enedina Fish MRN: [id number] :1985 Visit Information Date & Time Provider Department Dept. Phone Encounter #  
 2018 11:30 AM Lachelle Arango NP [facility] [facility] [phone] [id number] Upcoming Health Maintenance Date Due Pneumococcal 19-64 Medium Risk (1 of 1 - PPSV23) 10/15/2005 DTaP/Tdap/Td series (1 - Tdap) 10/15/2007 PAP AKA CERVICAL CYTOLOGY 2016 MEDICARE YEARLY EXAM 3/14/2018 Influenza Age 5 to Adult 2018 Allergies as of 2018  Review Complete On: 2018 By: Lachelle Arango NP Severity Noted Reaction Type Reactions Lortab [Hydrocodone-acetaminophen]  2010    Other (comments) FACIAL SWELLING Morphine  2016    Other (comments) Blisters on body - 
 Denies Medication Allergy/Reaction on 10/11/2017 Current Immunizations  Reviewed on 2014 Name Date Influenza Vaccine 2013 Influenza Vaccine Split 2012, 10/18/2011, 2010 Not reviewed this visit You Were Diagnosed With   
  
 Codes Comments Attention deficit disorder, unspecified hyperactivity presence    -  Primary ICD-10-CM: F98.8 ICD-9-CM: 314.00 Depression with anxiety     ICD-10-CM: F41.8 ICD-9-CM: 300.4 Schizoaffective disorder, bipolar type ([facility]     ICD-10-CM: F25.0 ICD-9-CM: 295.70 Vitals BP Pulse Temp Resp Height(growth percentile) Weight(growth percentile) 103/73 (BP 1 Location: Right arm, BP Patient Position: Sitting) 99 97.8 °F (36.6 °C) (Oral) 18 5' 3\" (1.6 m) 120 lb (54.4 kg) LMP SpO2 BMI OB Status Smoking Status 2018 100% 21.26 kg/m2 Having regular periods Never Smoker BMI and BSA Data Body Mass Index Body Surface Area  
 21.26 kg/m 2 1.56 m 2 Preferred Pharmacy Pharmacy Name Phone [facility] [address] [phone] Your Updated Medication List  
  
   
This list is accurate as of 7/23/18 11:42 AM.  Always use your most recent med list.  
  
  
  
  
 baclofen 10 mg tablet Commonly known as:  LIORESAL Take 1 Tab by mouth three (3) times daily. As needed for tension headache  
  
 citalopram 20 mg tablet Commonly known as:  Ella Eye Take 1 Tab by mouth daily. clonazePAM 1 mg tablet Commonly known as:  Cely Patch Take 1 Tab by mouth three (3) times daily. As needed for anxiety - must last 30 days  
  
 dextroamphetamine-amphetamine 10 mg tablet Commonly known as:  ADDERALL Take 1 Tab (10 mg total) by mouth daily. - must last 30 days Max Daily Amount: 10 mg  
  
 methylPREDNISolone 4 mg tablet Commonly known as:  Deng Teresa Per package instructions. ondansetron 4 mg disintegrating tablet Commonly known as:  ZOFRAN ODT Take 1 Tab by mouth every eight (8) hours as needed for Nausea. SUMAtriptan 100 mg tablet Commonly known as:  IMITREX Take 1 Tab by mouth once as needed for Migraine for up to 1 dose. Indications: Migraine  
  
 topiramate 25 mg tablet Commonly known as:  TOPAMAX Take 1 Tab by mouth nightly. Indications: MIGRAINE PREVENTION Prescriptions Printed Refills  
 dextroamphetamine-amphetamine (ADDERALL) 10 mg tablet 0 Sig: Take 1 Tab (10 mg total) by mouth daily. - must last 30 days Max Daily Amount: 10 mg  
 Class: Print Route: Oral  
 clonazePAM (KLONOPIN) 1 mg tablet 1 Sig: Take 1 Tab by mouth three (3) times daily. As needed for anxiety - must last 30 days Class: Print Route: Oral  
  
Prescriptions Sent to Pharmacy Refills  
 baclofen (LIORESAL) 10 mg tablet 1 Sig: Take 1 Tab by mouth three (3) times daily. As needed for tension headache  Class: Normal  
 [facility]: [id number] [facility], [address] [place], [place] [address] #: [phone] Route: Oral  
  
Introducing [facility] & HEALTH SERVICES! Katlin Vasquez introduces [employer] patient portal. Now you can access parts of your medical record, email your doctor's office, and request medication refills online. 1. In your internet browser, go to [email or web address] 2. Click on the First Time User? Click Here link in the Sign In box. You will see the New Member Sign Up page. 3. Enter your Tecnoblu Access Code exactly as it appears below. You will not need to use this code after youve completed the sign-up process. If you do not sign up before the expiration date, you must request a new code. · Tecnoblu Access Code: [email or web address] Expires: 8/30/2018  4:20 PM 
 
4. Enter the last four digits of your Social Security Number (xxxx) and Date of Birth (mm/dd/yyyy) as indicated and click Submit. You will be taken to the next sign-up page. 5. Create a Tecnoblu ID. This will be your Tecnoblu login ID and cannot be changed, so think of one that is secure and easy to remember. 6. Create a Tecnoblu password. You can change your password at any time. 7. Enter your Password Reset Question and Answer. This can be used at a later time if you forget your password. 8. Enter your e-mail address. You will receive e-mail notification when new information is available in [address] 9. Click Sign Up. You can now view and download portions of your medical record. 10. Click the Download Summary menu link to download a portable copy of your medical information. If you have questions, please visit the Frequently Asked Questions section of the Tecnoblu website. Remember, Tecnoblu is NOT to be used for urgent needs. For medical emergencies, dial 911. Now available from your iPhone and Android! Please provide this summary of care documentation to your next provider. Your primary care clinician is listed as Essie Lyons.  If you have any questions after today's visit, please call [phone].

## 2018-07-23 NOTE — PROGRESS NOTES
Chief Complaint   Patient presents with    Attention Deficit Disorder    Depression    Anxiety    Medication Evaluation     Patient in office today for med check. Pt state she feels very anxious,would like to discuss increase in home stressors. 1. Have you been to the ER, urgent care clinic since your last visit? Hospitalized since your last visit? No    2. Have you seen or consulted any other health care providers outside of the [facility] since your last visit? Include any pap smears or colon screening.  No
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
## 2018-08-02 ENCOUNTER — TELEPHONE (OUTPATIENT)
Dept: FAMILY MEDICINE CLINIC | Age: 33
End: 2018-08-02

## 2018-08-03 NOTE — TELEPHONE ENCOUNTER
Spoke with patient, has appt on Thursday, going to need letter for court and wants drug testing. Jeferson Dodson

## 2018-09-04 ENCOUNTER — OFFICE VISIT (OUTPATIENT)
Dept: FAMILY MEDICINE CLINIC | Age: 33
End: 2018-09-04

## 2018-09-04 VITALS
DIASTOLIC BLOOD PRESSURE: 81 MMHG | RESPIRATION RATE: 16 BRPM | TEMPERATURE: 98.4 F | OXYGEN SATURATION: 98 % | WEIGHT: 126 LBS | HEIGHT: 63 IN | BODY MASS INDEX: 22.32 KG/M2 | HEART RATE: 111 BPM | SYSTOLIC BLOOD PRESSURE: 122 MMHG

## 2018-09-04 DIAGNOSIS — F41.8 DEPRESSION WITH ANXIETY: Primary | ICD-10-CM

## 2018-09-04 DIAGNOSIS — F32.2 SEVERE DEPRESSION (HCC): ICD-10-CM

## 2018-09-04 RX ORDER — CLONAZEPAM 1 MG/1
1 TABLET ORAL 3 TIMES DAILY
Qty: 90 TAB | Refills: 1 | Status: SHIPPED | OUTPATIENT
Start: 2018-09-04 | End: 2018-10-23 | Stop reason: SDUPTHER

## 2018-09-04 RX ORDER — CITALOPRAM 40 MG/1
40 TABLET, FILM COATED ORAL DAILY
Qty: 30 TAB | Refills: 5 | Status: SHIPPED | OUTPATIENT
Start: 2018-09-04 | End: 2019-07-03 | Stop reason: SDUPTHER

## 2018-09-04 RX ORDER — ONDANSETRON 4 MG/1
TABLET, FILM COATED ORAL
Qty: 30 TAB | Refills: 0 | Status: SHIPPED | OUTPATIENT
Start: 2018-09-04 | End: 2020-07-13 | Stop reason: ALTCHOICE

## 2018-09-04 RX ORDER — BACLOFEN 10 MG/1
10 TABLET ORAL 3 TIMES DAILY
Qty: 60 TAB | Refills: 1 | Status: SHIPPED | OUTPATIENT
Start: 2018-09-04 | End: 2018-10-23 | Stop reason: SDUPTHER

## 2018-09-04 RX ORDER — CITALOPRAM 40 MG/1
40 TABLET, FILM COATED ORAL DAILY
Qty: 30 TAB | Refills: 5 | Status: SHIPPED | OUTPATIENT
Start: 2018-09-04 | End: 2018-09-04 | Stop reason: SDUPTHER

## 2018-09-04 NOTE — MR AVS SNAPSHOT
[address] [address] 
[phone] Patient: Cheyanne Werner MRN: [id number] :[id number] Visit Information Date & Time Provider Department Dept. Phone Encounter #  
 2018  1:45 PM Audrey Singh NP [facility] [phone] [id number] Upcoming Health Maintenance Date Due Pneumococcal 19-64 Medium Risk (1 of 1 - PPSV23) 10/15/2005 DTaP/Tdap/Td series (1 - Tdap) 10/15/2007 PAP AKA CERVICAL CYTOLOGY 2016 MEDICARE YEARLY EXAM 3/14/2018 Influenza Age 5 to Adult 2018 Allergies as of 2018  Review Complete On: 2018 By: Jeffrey Messina LPN Severity Noted Reaction Type Reactions Lortab [Hydrocodone-acetaminophen]  2010    Other (comments) FACIAL SWELLING Morphine  2016    Other (comments) Blisters on body - 
 Denies Medication Allergy/Reaction on 10/11/2017 Current Immunizations  Reviewed on 2014 Name Date Influenza Vaccine 2013 Influenza Vaccine Split 2012, 10/18/2011, 2010 Not reviewed this visit You Were Diagnosed With   
  
 Codes Comments Depression with anxiety    -  Primary ICD-10-CM: F41.8 ICD-9-CM: 300.4 Severe [facility] ([facility]     ICD-10-CM: F32.2 ICD-9-CM: 560 Vitals BP Pulse Temp Resp Height(growth percentile) Weight(growth percentile) 122/81 (!) 111 98.4 °F (36.9 °C) 16 5' 3\" (1.6 m) 126 lb (57.2 kg) LMP SpO2 BMI OB Status Smoking Status 2018 (Exact Date) 98% 22.32 kg/m2 Having regular periods Never Smoker Vitals History BMI and BSA Data Body Mass Index Body Surface Area  
 22.32 kg/m 2 1.59 m 2 Preferred Pharmacy Pharmacy Name Phone [id number] [phone] Your Updated Medication List  
  
   
 This list is accurate as of 9/4/18  2:28 PM.  Always use your most recent med list.  
  
  
  
  
 baclofen 10 mg tablet Commonly known as:  LIORESAL Take 1 Tab by mouth three (3) times daily. As needed for tension headache  
  
 citalopram 40 mg tablet Commonly known as:  Vero Letters Take 1 Tab by mouth daily. clonazePAM 1 mg tablet Commonly known as:  Gwyndolyn Rasher Take 1 Tab by mouth three (3) times daily. As needed for anxiety - must last 30 days  
  
 dextroamphetamine-amphetamine 10 mg tablet Commonly known as:  ADDERALL Take 1 Tab (10 mg total) by mouth daily. - must last 30 days Max Daily Amount: 10 mg  
  
 ondansetron 4 mg disintegrating tablet Commonly known as:  ZOFRAN ODT Take 1 Tab by mouth every eight (8) hours as needed for Nausea. SUMAtriptan 100 mg tablet Commonly known as:  IMITREX Take 1 Tab by mouth once as needed for Migraine for up to 1 dose. Indications: Migraine  
  
 topiramate 25 mg tablet Commonly known as:  TOPAMAX Take 1 Tab by mouth nightly. Indications: MIGRAINE PREVENTION Prescriptions Printed Refills  
 clonazePAM (KLONOPIN) 1 mg tablet 1 Sig: Take 1 Tab by mouth three (3) times daily. As needed for anxiety - must last 30 days Class: Print Route: Oral  
  
Prescriptions Sent to Pharmacy Refills  
 citalopram (CELEXA) 40 mg tablet 5 Sig: Take 1 Tab by mouth daily. Class: Normal  
 Pharmacy: [id number] [address] [place] [address] Ph #: [phone] Route: Oral  
  
Introducing [facility]! [facility] introduces [email or web address] patient portal. Now you can access parts of your medical record, email your doctor's office, and request medication refills online. 1. In your internet browser, go to [email or web address] 2. Click on the First Time User? Click Here link in the Sign In box. You will see the New Member Sign Up page. 3. Enter your [employer] Access Code exactly as it appears below. You will not need to use this code after youve completed the sign-up process. If you do not sign up before the expiration date, you must request a new code. · [employer] Access Code: [email or web address] Expires: 12/3/2018  2:28 PM 
 
4. Enter the last four digits of your Social Security Number (xxxx) and Date of Birth (mm/dd/yyyy) as indicated and click Submit. You will be taken to the next sign-up page. 5. Create a [employer] ID. This will be your [employer] login ID and cannot be changed, so think of one that is secure and easy to remember. 6. Create a [employer] password. You can change your password at any time. 7. Enter your Password Reset Question and Answer. This can be used at a later time if you forget your password. 8. Enter your e-mail address. You will receive e-mail notification when new information is available in [address] 9. Click Sign Up. You can now view and download portions of your medical record. 10. Click the Download Summary menu link to download a portable copy of your medical information. If you have questions, please visit the Frequently Asked Questions section of the [employer] website. Remember, [employer] is NOT to be used for urgent needs. For medical emergencies, dial 911. Now available from your iPhone and Android! Please provide this summary of care documentation to your next provider. Your primary care clinician is listed as Joanne Waite. If you have any questions after today's visit, please call [phone].

## 2018-09-04 NOTE — PROGRESS NOTES
Chief Complaint   Patient presents with    Medication Evaluation    Medication Refill        1. Have you been to the ER, urgent care clinic since your last visit? Hospitalized since your last visit? No    2. Have you seen or consulted any other health care providers outside of the [address] [place] [address] since your last visit? Include any pap smears or colon screening.  No

## 2018-10-23 ENCOUNTER — OFFICE VISIT (OUTPATIENT)
Dept: FAMILY MEDICINE CLINIC | Age: 33
End: 2018-10-23

## 2018-10-23 VITALS
HEIGHT: 63 IN | BODY MASS INDEX: 21.97 KG/M2 | DIASTOLIC BLOOD PRESSURE: 80 MMHG | SYSTOLIC BLOOD PRESSURE: 100 MMHG | HEART RATE: 115 BPM | OXYGEN SATURATION: 100 % | TEMPERATURE: 98.4 F | WEIGHT: 124 LBS | RESPIRATION RATE: 18 BRPM

## 2018-10-23 DIAGNOSIS — F41.8 DEPRESSION WITH ANXIETY: ICD-10-CM

## 2018-10-23 DIAGNOSIS — F98.8 ATTENTION DEFICIT DISORDER, UNSPECIFIED HYPERACTIVITY PRESENCE: ICD-10-CM

## 2018-10-23 RX ORDER — CLONAZEPAM 1 MG/1
1 TABLET ORAL 3 TIMES DAILY
Qty: 90 TAB | Refills: 1 | Status: SHIPPED | OUTPATIENT
Start: 2018-10-23 | End: 2018-12-14 | Stop reason: SDUPTHER

## 2018-10-23 RX ORDER — DEXTROAMPHETAMINE SACCHARATE, AMPHETAMINE ASPARTATE, DEXTROAMPHETAMINE SULFATE AND AMPHETAMINE SULFATE 5; 5; 5; 5 MG/1; MG/1; MG/1; MG/1
20 TABLET ORAL DAILY
Qty: 30 TAB | Refills: 0 | Status: SHIPPED | OUTPATIENT
Start: 2018-10-23 | End: 2019-10-09 | Stop reason: SDUPTHER

## 2018-10-23 RX ORDER — BACLOFEN 10 MG/1
10 TABLET ORAL 3 TIMES DAILY
Qty: 60 TAB | Refills: 1 | Status: SHIPPED | OUTPATIENT
Start: 2018-10-23 | End: 2018-10-23 | Stop reason: SDUPTHER

## 2018-10-23 RX ORDER — BACLOFEN 10 MG/1
10 TABLET ORAL 3 TIMES DAILY
Qty: 60 TAB | Refills: 1 | Status: SHIPPED | OUTPATIENT
Start: 2018-10-23 | End: 2018-12-14 | Stop reason: SDUPTHER

## 2018-10-23 RX ORDER — DEXTROAMPHETAMINE SACCHARATE, AMPHETAMINE ASPARTATE, DEXTROAMPHETAMINE SULFATE AND AMPHETAMINE SULFATE 5; 5; 5; 5 MG/1; MG/1; MG/1; MG/1
20 TABLET ORAL DAILY
Qty: 30 TAB | Refills: 0 | Status: SHIPPED | OUTPATIENT
Start: 2018-10-23 | End: 2019-07-03 | Stop reason: SDUPTHER

## 2018-10-23 NOTE — PROGRESS NOTES
Chief Complaint   Patient presents with    Medication Refill     Celexa, Adderall, Klonopin and Baclofen     1. Have you been to the ER, urgent care clinic since your last visit? Hospitalized since your last visit? No    2. Have you seen or consulted any other health care providers outside of the [address] [place] [address] since your last visit? Include any pap smears or colon screening.  No    Visit Vitals  /80 (BP 1 Location: Left arm, BP Patient Position: Sitting)   Pulse (!) 115   Temp 98.4 °F (36.9 °C) (Oral)   Resp 18   Ht 5' 3\" (1.6 m)   Wt 124 lb (56.2 kg)   SpO2 100%   BMI 21.97 kg/m²

## 2018-10-23 NOTE — PROGRESS NOTES
HISTORY OF PRESENT ILLNESS  Kunal Begum is a 29 y.o. female. HPI  Patient is here for follow up anxiety and ADD  She is doing much better now that all meds are being taken and boys are living with their father during the week and she has them every weekend  No adr/se of meds  No early refill requests    ROS  A comprehensive review of system was obtained and negative except findings in the HPI    Visit Vitals  /80 (BP 1 Location: Left arm, BP Patient Position: Sitting)   Pulse (!) 115   Temp 98.4 °F (36.9 °C) (Oral)   Resp 18   Ht 5' 3\" (1.6 m)   Wt 124 lb (56.2 kg)   LMP 10/23/2018   SpO2 100%   BMI 21.97 kg/m²     Physical Exam   Constitutional: She is oriented to person, place, and time. She appears well-developed and well-nourished. Neck: No JVD present. Cardiovascular: Normal rate, regular rhythm and intact distal pulses. Exam reveals no gallop and no friction rub. No murmur heard. Pulmonary/Chest: Effort normal and breath sounds normal. No respiratory distress. She has no wheezes. Musculoskeletal: She exhibits no edema. Neurological: She is alert and oriented to person, place, and time. Skin: Skin is warm. Nursing note and vitals reviewed. ASSESSMENT and PLAN  Encounter Diagnoses   Name Primary?  Depression with anxiety     Attention deficit disorder, unspecified hyperactivity presence      Orders Placed This Encounter    clonazePAM (KLONOPIN) 1 mg tablet    dextroamphetamine-amphetamine (ADDERALL) 20 mg tablet    dextroamphetamine-amphetamine (ADDERALL) 20 mg tablet    dextroamphetamine-amphetamine (ADDERALL) 20 mg tablet    baclofen (LIORESAL) 10 mg tablet     Refills updated today  Follow up 3 mo  Reviewed adr/se of med  I have discussed the diagnosis with the patient and the intended plan as seen in the above orders. The patient has received an after-visit summary and questions were answered concerning future plans.  Patient conveyed understanding of the plan at the time of the visit.     Katerin Schmitt, MSN, ANP  10/23/2018

## 2018-12-14 ENCOUNTER — OFFICE VISIT (OUTPATIENT)
Dept: FAMILY MEDICINE CLINIC | Age: 33
End: 2018-12-14

## 2018-12-14 VITALS
RESPIRATION RATE: 17 BRPM | BODY MASS INDEX: 21.93 KG/M2 | WEIGHT: 123.8 LBS | HEART RATE: 122 BPM | TEMPERATURE: 97.4 F | SYSTOLIC BLOOD PRESSURE: 100 MMHG | OXYGEN SATURATION: 100 % | HEIGHT: 63 IN | DIASTOLIC BLOOD PRESSURE: 66 MMHG

## 2018-12-14 DIAGNOSIS — F41.8 DEPRESSION WITH ANXIETY: ICD-10-CM

## 2018-12-14 RX ORDER — TOPIRAMATE 25 MG/1
25 TABLET ORAL
Qty: 30 TAB | Refills: 6 | Status: SHIPPED | OUTPATIENT
Start: 2018-12-14 | End: 2019-07-03 | Stop reason: SDUPTHER

## 2018-12-14 RX ORDER — CLONAZEPAM 1 MG/1
1 TABLET ORAL 3 TIMES DAILY
Qty: 90 TAB | Refills: 1 | Status: SHIPPED | OUTPATIENT
Start: 2018-12-14 | End: 2019-07-03 | Stop reason: SDUPTHER

## 2018-12-14 RX ORDER — BACLOFEN 10 MG/1
10 TABLET ORAL 3 TIMES DAILY
Qty: 60 TAB | Refills: 1 | Status: SHIPPED | OUTPATIENT
Start: 2018-12-14 | End: 2019-10-09 | Stop reason: SDUPTHER

## 2018-12-14 NOTE — PROGRESS NOTES
Chief Complaint   Patient presents with    Medication Refill     topamax     Medication Evaluation     would like discuss changing Klonopin. States that it is not working as well as it used to.  Other     declined flu shot      1. Have you been to the ER, urgent care clinic since your last visit? Hospitalized since your last visit? No    2. Have you seen or consulted any other health care providers outside of the [address] [place] [address] since your last visit? Include any pap smears or colon screening.  No

## 2018-12-14 NOTE — PROGRESS NOTES
HISTORY OF PRESENT ILLNESS  Mckayla Mckeon is a 29 y.o. female. HPI  She is here today for follow up anxiety and ADD  Anxiety is still high due to custody issues with ex and 2 sons that are living with him  On her celexa and klonopin TID  She needs to get a refill of the topamax and baclofen    ROS  A comprehensive review of system was obtained and negative except findings in the HPI    Visit Vitals  /66   Pulse (!) 122   Temp 97.4 °F (36.3 °C)   Resp 17   Ht 5' 3\" (1.6 m)   Wt 123 lb 12.8 oz (56.2 kg)   LMP 12/01/2018 (Exact Date)   SpO2 100%   BMI 21.93 kg/m²     Physical Exam   Constitutional: She is oriented to person, place, and time. She appears well-developed and well-nourished. Neck: No JVD present. Cardiovascular: Normal rate, regular rhythm and intact distal pulses. Exam reveals no gallop and no friction rub. No murmur heard. Pulmonary/Chest: Effort normal and breath sounds normal. No respiratory distress. She has no wheezes. Musculoskeletal: She exhibits no edema. Neurological: She is alert and oriented to person, place, and time. Skin: Skin is warm. Nursing note and vitals reviewed. ASSESSMENT and PLAN  Encounter Diagnoses   Name Primary?  Depression with anxiety      Orders Placed This Encounter    clonazePAM (KLONOPIN) 1 mg tablet    baclofen (LIORESAL) 10 mg tablet    topiramate (TOPAMAX) 25 mg tablet     All refills updated today  Still has 1 more Adderall rx from last visit  Follow up 1 mo  I have discussed the diagnosis with the patient and the intended plan as seen in the above orders. The patient has received an after-visit summary and questions were answered concerning future plans. Patient conveyed understanding of the plan at the time of the visit.     Florina Thomas, MSN, ANP  12/14/2018

## 2019-07-03 ENCOUNTER — OFFICE VISIT (OUTPATIENT)
Dept: FAMILY MEDICINE CLINIC | Age: 34
End: 2019-07-03

## 2019-07-03 VITALS
HEART RATE: 97 BPM | OXYGEN SATURATION: 99 % | BODY MASS INDEX: 23.74 KG/M2 | HEIGHT: 63 IN | SYSTOLIC BLOOD PRESSURE: 123 MMHG | TEMPERATURE: 98.1 F | RESPIRATION RATE: 14 BRPM | WEIGHT: 134 LBS | DIASTOLIC BLOOD PRESSURE: 83 MMHG

## 2019-07-03 DIAGNOSIS — F41.8 DEPRESSION WITH ANXIETY: ICD-10-CM

## 2019-07-03 DIAGNOSIS — F98.8 ATTENTION DEFICIT DISORDER, UNSPECIFIED HYPERACTIVITY PRESENCE: ICD-10-CM

## 2019-07-03 RX ORDER — TOPIRAMATE 25 MG/1
50 TABLET ORAL 2 TIMES DAILY WITH MEALS
Qty: 120 TAB | Refills: 5 | Status: SHIPPED | OUTPATIENT
Start: 2019-07-03 | End: 2020-07-13 | Stop reason: ALTCHOICE

## 2019-07-03 RX ORDER — CITALOPRAM 20 MG/1
20 TABLET, FILM COATED ORAL DAILY
Qty: 30 TAB | Refills: 5 | Status: SHIPPED | OUTPATIENT
Start: 2019-07-03 | End: 2019-10-09 | Stop reason: SDUPTHER

## 2019-07-03 RX ORDER — CLONAZEPAM 0.5 MG/1
0.5 TABLET ORAL 2 TIMES DAILY
Qty: 60 TAB | Refills: 0 | Status: SHIPPED | OUTPATIENT
Start: 2019-07-03 | End: 2019-10-09 | Stop reason: SDUPTHER

## 2019-07-03 RX ORDER — DEXTROAMPHETAMINE SACCHARATE, AMPHETAMINE ASPARTATE, DEXTROAMPHETAMINE SULFATE AND AMPHETAMINE SULFATE 2.5; 2.5; 2.5; 2.5 MG/1; MG/1; MG/1; MG/1
10 TABLET ORAL DAILY
Qty: 30 TAB | Refills: 0 | Status: SHIPPED | OUTPATIENT
Start: 2019-07-03 | End: 2019-09-24 | Stop reason: SDUPTHER

## 2019-07-03 NOTE — PROGRESS NOTES
HISTORY OF PRESENT ILLNESS  Reymundo Proctor is a 29 y.o. female. HPI  Chief Complaint   Patient presents with    Medication Refill     pt reports d/c all meds back in Dec 2018    Anxiety     pt reports having severe issues w/ Anxiety since d/c all meds     Got in trouble with DUI with child in the car  In counseling with [facility] and recommended to restart her anxiety meds  Panic attacks out of control    ROS  A comprehensive review of system was obtained and negative except findings in the HPI    Visit Vitals  /83 (BP 1 Location: Left arm, BP Patient Position: Sitting)   Pulse 97   Temp 98.1 °F (36.7 °C) (Oral)   Resp 14   Ht 5' 3\" (1.6 m)   Wt 134 lb (60.8 kg)   SpO2 99%   BMI 23.74 kg/m²     Physical Exam   Constitutional: She is oriented to person, place, and time. She appears well-developed and well-nourished. Neck: No JVD present. Cardiovascular: Normal rate, regular rhythm and intact distal pulses. Exam reveals no gallop and no friction rub. No murmur heard. Pulmonary/Chest: Effort normal and breath sounds normal. No respiratory distress. She has no wheezes. Musculoskeletal: She exhibits no edema. Neurological: She is alert and oriented to person, place, and time. Skin: Skin is warm. Nursing note and vitals reviewed. ASSESSMENT and PLAN  Encounter Diagnoses   Name Primary?  Attention deficit disorder, unspecified hyperactivity presence     Depression with anxiety      Orders Placed This Encounter    citalopram (CELEXA) 20 mg tablet    topiramate (TOPAMAX) 25 mg tablet    dextroamphetamine-amphetamine (ADDERALL) 10 mg tablet    clonazePAM (KLONOPIN) 0.5 mg tablet     Refills restarted of celexa 20mg, taper up to topamax 50mg bid  Refilled adderrall 10mg qam and klonopin . 5mg  Reviewed precautions from the medication  Must tell [facility] the new med list and follow up in 3 weeks.     I have discussed the diagnosis with the patient and the intended plan as seen in the above orders. The patient has received an after-visit summary and questions were answered concerning future plans. Patient conveyed understanding of the plan at the time of the visit.     Shay Espinosa, MSN, ANP  7/3/2019

## 2019-07-03 NOTE — PROGRESS NOTES
Rajiv Fontenot is a 29 y.o. female    Chief Complaint   Patient presents with    Medication Refill     pt reports d/c all meds back in Dec 2018    Anxiety     pt reports having severe issues w/ Anxiety since d/c all meds     1. Have you been to the ER, urgent care clinic since your last visit? Hospitalized since your last visit? No    2. Have you seen or consulted any other health care providers outside of the [address] [place] [address] since your last visit? Include any pap smears or colon screening.  No  Visit Vitals  /83 (BP 1 Location: Left arm, BP Patient Position: Sitting)   Pulse 97   Temp 98.1 °F (36.7 °C) (Oral)   Resp 14   Ht 5' 3\" (1.6 m)   Wt 134 lb (60.8 kg)   SpO2 99%   BMI 23.74 kg/m²

## 2019-09-24 ENCOUNTER — TELEPHONE (OUTPATIENT)
Dept: FAMILY MEDICINE CLINIC | Age: 34
End: 2019-09-24

## 2019-09-24 DIAGNOSIS — F98.8 ATTENTION DEFICIT DISORDER, UNSPECIFIED HYPERACTIVITY PRESENCE: ICD-10-CM

## 2019-09-24 RX ORDER — DEXTROAMPHETAMINE SACCHARATE, AMPHETAMINE ASPARTATE, DEXTROAMPHETAMINE SULFATE AND AMPHETAMINE SULFATE 2.5; 2.5; 2.5; 2.5 MG/1; MG/1; MG/1; MG/1
10 TABLET ORAL DAILY
Qty: 30 TAB | Refills: 0 | Status: SHIPPED | OUTPATIENT
Start: 2019-09-24 | End: 2019-10-09 | Stop reason: SDUPTHER

## 2019-09-24 NOTE — TELEPHONE ENCOUNTER
Patient states that she has an appt to get adderall refilled on 10/09/19. Told patient we did not have a sooner appt for med refill with [facility]. She would like to pickup rx for adderall from office to last until she comes in for appt. She states she done that before.

## 2019-10-09 ENCOUNTER — OFFICE VISIT (OUTPATIENT)
Dept: FAMILY MEDICINE CLINIC | Age: 34
End: 2019-10-09

## 2019-10-09 VITALS
RESPIRATION RATE: 12 BRPM | HEART RATE: 84 BPM | WEIGHT: 128 LBS | OXYGEN SATURATION: 99 % | TEMPERATURE: 97.9 F | BODY MASS INDEX: 22.68 KG/M2 | SYSTOLIC BLOOD PRESSURE: 141 MMHG | HEIGHT: 63 IN | DIASTOLIC BLOOD PRESSURE: 88 MMHG

## 2019-10-09 DIAGNOSIS — F98.8 ATTENTION DEFICIT DISORDER, UNSPECIFIED HYPERACTIVITY PRESENCE: ICD-10-CM

## 2019-10-09 DIAGNOSIS — F41.8 DEPRESSION WITH ANXIETY: ICD-10-CM

## 2019-10-09 RX ORDER — DEXTROAMPHETAMINE SACCHARATE, AMPHETAMINE ASPARTATE, DEXTROAMPHETAMINE SULFATE AND AMPHETAMINE SULFATE 5; 5; 5; 5 MG/1; MG/1; MG/1; MG/1
20 TABLET ORAL DAILY
Qty: 30 TAB | Refills: 0 | Status: SHIPPED | OUTPATIENT
Start: 2019-10-09 | End: 2020-01-21 | Stop reason: SDUPTHER

## 2019-10-09 RX ORDER — CITALOPRAM 20 MG/1
20 TABLET, FILM COATED ORAL DAILY
Qty: 30 TAB | Refills: 5 | Status: SHIPPED | OUTPATIENT
Start: 2019-10-09 | End: 2020-07-13 | Stop reason: SDUPTHER

## 2019-10-09 RX ORDER — CLONAZEPAM 0.5 MG/1
0.5 TABLET ORAL 2 TIMES DAILY
Qty: 60 TAB | Refills: 2 | Status: SHIPPED | OUTPATIENT
Start: 2019-10-09 | End: 2020-01-21 | Stop reason: SDUPTHER

## 2019-10-09 RX ORDER — BACLOFEN 10 MG/1
10 TABLET ORAL 3 TIMES DAILY
Qty: 60 TAB | Refills: 1 | Status: SHIPPED | OUTPATIENT
Start: 2019-10-09 | End: 2020-01-21 | Stop reason: SDUPTHER

## 2019-10-09 NOTE — PROGRESS NOTES
Chief Complaint   Patient presents with    Medication Refill     Pt in office today for med refill  1. Have you been to the ER, urgent care clinic since your last visit? Hospitalized since your last visit? No    2. Have you seen or consulted any other health care providers outside of the [address] [place] [address] since your last visit? Include any pap smears or colon screening.  No     Pt has no other concerns

## 2019-10-09 NOTE — PROGRESS NOTES
HISTORY OF PRESENT ILLNESS  Marco Marcano is a 29 y.o. female. HPI  Patient returns to office for follow up ADD. Stable on medication without weight loss, decreased appetite, insomnia, or tremor. Good focus control. Gets three months of scripts at a time. She is managed on Adderall 20mg in the am. She also needs refill of her klonopin that she takes scheduled daily and celexa. She feels she is in a good place now, getting along with her ex- finally and child custody issues are better. ROS  A comprehensive review of system was obtained and negative except findings in the HPI    Visit Vitals  /88 (BP 1 Location: Left arm, BP Patient Position: Sitting)   Pulse 84   Temp 97.9 °F (36.6 °C) (Oral)   Resp 12   Ht 5' 3\" (1.6 m)   Wt 128 lb (58.1 kg)   LMP 09/29/2019   SpO2 99%   BMI 22.67 kg/m²     Physical Exam   Constitutional: She is oriented to person, place, and time. She appears well-developed and well-nourished. Neck: No JVD present. Cardiovascular: Normal rate, regular rhythm and intact distal pulses. Exam reveals no gallop and no friction rub. No murmur heard. Pulmonary/Chest: Effort normal and breath sounds normal. No respiratory distress. She has no wheezes. Musculoskeletal: She exhibits no edema. Neurological: She is alert and oriented to person, place, and time. Skin: Skin is warm. Nursing note and vitals reviewed. ASSESSMENT and PLAN  Encounter Diagnoses   Name Primary?     Depression with anxiety     Attention deficit disorder, unspecified hyperactivity presence      Orders Placed This Encounter    citalopram (CELEXA) 20 mg tablet    clonazePAM (KLONOPIN) 0.5 mg tablet    dextroamphetamine-amphetamine (ADDERALL) 20 mg tablet    dextroamphetamine-amphetamine (ADDERALL) 20 mg tablet    dextroamphetamine-amphetamine (ADDERALL) 20 mg tablet    baclofen (LIORESAL) 10 mg tablet     Refills updated  Recheck 3 mo    I have discussed the diagnosis with the patient and the intended plan as seen in the above orders. The patient has received an after-visit summary and questions were answered concerning future plans. Patient conveyed understanding of the plan at the time of the visit.     JEREMY Luther, ANP  10/9/2019

## 2020-01-21 ENCOUNTER — OFFICE VISIT (OUTPATIENT)
Dept: FAMILY MEDICINE CLINIC | Age: 35
End: 2020-01-21

## 2020-01-21 VITALS
RESPIRATION RATE: 16 BRPM | DIASTOLIC BLOOD PRESSURE: 62 MMHG | SYSTOLIC BLOOD PRESSURE: 90 MMHG | BODY MASS INDEX: 20.38 KG/M2 | OXYGEN SATURATION: 99 % | TEMPERATURE: 98.4 F | HEART RATE: 108 BPM | WEIGHT: 115 LBS | HEIGHT: 63 IN

## 2020-01-21 DIAGNOSIS — F98.8 ATTENTION DEFICIT DISORDER, UNSPECIFIED HYPERACTIVITY PRESENCE: ICD-10-CM

## 2020-01-21 DIAGNOSIS — F41.8 DEPRESSION WITH ANXIETY: ICD-10-CM

## 2020-01-21 RX ORDER — CLONAZEPAM 0.5 MG/1
0.5 TABLET ORAL 2 TIMES DAILY
Qty: 60 TAB | Refills: 2 | Status: SHIPPED | OUTPATIENT
Start: 2020-01-21 | End: 2020-04-20 | Stop reason: SDUPTHER

## 2020-01-21 RX ORDER — DEXTROAMPHETAMINE SACCHARATE, AMPHETAMINE ASPARTATE, DEXTROAMPHETAMINE SULFATE AND AMPHETAMINE SULFATE 5; 5; 5; 5 MG/1; MG/1; MG/1; MG/1
20 TABLET ORAL DAILY
Qty: 30 TAB | Refills: 0 | Status: SHIPPED | OUTPATIENT
Start: 2020-03-21 | End: 2020-04-20 | Stop reason: SDUPTHER

## 2020-01-21 RX ORDER — DEXTROAMPHETAMINE SACCHARATE, AMPHETAMINE ASPARTATE, DEXTROAMPHETAMINE SULFATE AND AMPHETAMINE SULFATE 5; 5; 5; 5 MG/1; MG/1; MG/1; MG/1
20 TABLET ORAL DAILY
Qty: 30 TAB | Refills: 0 | Status: SHIPPED | OUTPATIENT
Start: 2020-01-21 | End: 2020-04-20 | Stop reason: SDUPTHER

## 2020-01-21 RX ORDER — DEXTROAMPHETAMINE SACCHARATE, AMPHETAMINE ASPARTATE, DEXTROAMPHETAMINE SULFATE AND AMPHETAMINE SULFATE 5; 5; 5; 5 MG/1; MG/1; MG/1; MG/1
20 TABLET ORAL DAILY
Qty: 30 TAB | Refills: 0 | Status: SHIPPED | OUTPATIENT
Start: 2020-02-21 | End: 2020-04-20 | Stop reason: SDUPTHER

## 2020-01-21 RX ORDER — BACLOFEN 10 MG/1
10 TABLET ORAL 3 TIMES DAILY
Qty: 60 TAB | Refills: 1 | Status: SHIPPED | OUTPATIENT
Start: 2020-01-21 | End: 2020-04-20 | Stop reason: SDUPTHER

## 2020-01-21 NOTE — PROGRESS NOTES
Chief Complaint   Patient presents with    Medication Refill     Pt in office today for med refill    1. Have you been to the ER, urgent care clinic since your last visit? Hospitalized since your last visit? No    2. Have you seen or consulted any other health care providers outside of the [address] [place] [address] since your last visit? Include any pap smears or colon screening.  No     Pt has no other concerns

## 2020-01-22 NOTE — PROGRESS NOTES
HISTORY OF PRESENT ILLNESS  Marlon Brumfield is a 36 y.o. female. HPI  She is here today for follow up anxiety and ADD  She has been out of meds for a few weeks  Has been anxious and depressed  Took care of her step mom the last few weeks of her life who  on  of liver cirrhosis. ROS  A comprehensive review of system was obtained and negative except findings in the HPI    Visit Vitals  BP 90/62 (BP 1 Location: Left arm, BP Patient Position: Sitting)   Pulse (!) 108   Temp 98.4 °F (36.9 °C) (Oral)   Resp 16   Ht 5' 3\" (1.6 m)   Wt 115 lb (52.2 kg)   LMP 2020   SpO2 99%   BMI 20.37 kg/m²     Physical Exam  Vitals signs and nursing note reviewed. Constitutional:       Appearance: She is well-developed. Comments:      Neck:      Vascular: No JVD. Cardiovascular:      Rate and Rhythm: Normal rate and regular rhythm. Heart sounds: No murmur. No friction rub. No gallop. Pulmonary:      Effort: Pulmonary effort is normal. No respiratory distress. Breath sounds: Normal breath sounds. No wheezing. Skin:     General: Skin is warm. Neurological:      Mental Status: She is alert and oriented to person, place, and time. ASSESSMENT and PLAN  Encounter Diagnoses   Name Primary?  Attention deficit disorder, unspecified hyperactivity presence     Depression with anxiety      Orders Placed This Encounter    dextroamphetamine-amphetamine (ADDERALL) 20 mg tablet    baclofen (LIORESAL) 10 mg tablet    clonazePAM (KLONOPIN) 0.5 mg tablet    dextroamphetamine-amphetamine (ADDERALL) 20 mg tablet    dextroamphetamine-amphetamine (ADDERALL) 20 mg tablet     Refills updated  Recheck 3 weeks for progress with mood  I have discussed the diagnosis with the patient and the intended plan as seen in the above orders. The patient has received an after-visit summary and questions were answered concerning future plans.  Patient conveyed understanding of the plan at the time of the visit.    Manual Samm, MSN, ANP  1/21/2020

## 2020-04-20 ENCOUNTER — VIRTUAL VISIT (OUTPATIENT)
Dept: FAMILY MEDICINE CLINIC | Age: 35
End: 2020-04-20

## 2020-04-20 DIAGNOSIS — F41.8 DEPRESSION WITH ANXIETY: ICD-10-CM

## 2020-04-20 DIAGNOSIS — F98.8 ATTENTION DEFICIT DISORDER, UNSPECIFIED HYPERACTIVITY PRESENCE: Primary | ICD-10-CM

## 2020-04-20 DIAGNOSIS — G44.219 EPISODIC TENSION-TYPE HEADACHE, NOT INTRACTABLE: ICD-10-CM

## 2020-04-20 RX ORDER — DEXTROAMPHETAMINE SACCHARATE, AMPHETAMINE ASPARTATE, DEXTROAMPHETAMINE SULFATE AND AMPHETAMINE SULFATE 5; 5; 5; 5 MG/1; MG/1; MG/1; MG/1
20 TABLET ORAL DAILY
Qty: 30 TAB | Refills: 0 | Status: SHIPPED | OUTPATIENT
Start: 2020-04-20 | End: 2020-07-13 | Stop reason: SDUPTHER

## 2020-04-20 RX ORDER — BACLOFEN 10 MG/1
10 TABLET ORAL 3 TIMES DAILY
Qty: 60 TAB | Refills: 1 | Status: SHIPPED | OUTPATIENT
Start: 2020-04-20 | End: 2020-06-18 | Stop reason: SDUPTHER

## 2020-04-20 RX ORDER — CLONAZEPAM 0.5 MG/1
0.5 TABLET ORAL 2 TIMES DAILY
Qty: 60 TAB | Refills: 2 | Status: SHIPPED | OUTPATIENT
Start: 2020-04-20 | End: 2020-07-13 | Stop reason: SDUPTHER
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
## 2020-06-18 DIAGNOSIS — G44.219 EPISODIC TENSION-TYPE HEADACHE, NOT INTRACTABLE: ICD-10-CM

## 2020-06-18 RX ORDER — BACLOFEN 10 MG/1
10 TABLET ORAL 3 TIMES DAILY
Qty: 60 TAB | Refills: 1 | Status: SHIPPED | OUTPATIENT
Start: 2020-06-18

## 2020-07-09 ENCOUNTER — TELEPHONE (OUTPATIENT)
Dept: FAMILY MEDICINE CLINIC | Age: 35
End: 2020-07-09

## 2020-07-09 NOTE — TELEPHONE ENCOUNTER
Patient wants refill of Konopin and Adderall. Her phone is broke and can't do by phone.     [phone]

## 2020-07-10 NOTE — TELEPHONE ENCOUNTER
Returned call to pt and ID x 3.    Appt made for 7/10/2020 W Plasty Text: The lesion was extirpated to the level of the fat with a #15 scalpel blade.  Given the location of the defect, shape of the defect and the proximity to free margins a W-plasty was deemed most appropriate for repair.  Using a sterile surgical marker, the appropriate transposition arms of the W-plasty were drawn incorporating the defect and placing the expected incisions within the relaxed skin tension lines where possible.    The area thus outlined was incised deep to adipose tissue with a #15 scalpel blade.  The skin margins were undermined to an appropriate distance in all directions utilizing iris scissors.  The opposing transposition arms were then transposed into place in opposite direction and anchored with interrupted buried subcutaneous sutures.

## 2020-07-13 ENCOUNTER — OFFICE VISIT (OUTPATIENT)
Dept: FAMILY MEDICINE CLINIC | Age: 35
End: 2020-07-13

## 2020-07-13 VITALS
HEIGHT: 63 IN | SYSTOLIC BLOOD PRESSURE: 117 MMHG | TEMPERATURE: 98.8 F | WEIGHT: 116 LBS | RESPIRATION RATE: 18 BRPM | BODY MASS INDEX: 20.55 KG/M2 | DIASTOLIC BLOOD PRESSURE: 78 MMHG | HEART RATE: 118 BPM

## 2020-07-13 DIAGNOSIS — F98.8 ATTENTION DEFICIT DISORDER, UNSPECIFIED HYPERACTIVITY PRESENCE: Primary | ICD-10-CM

## 2020-07-13 DIAGNOSIS — F41.8 DEPRESSION WITH ANXIETY: ICD-10-CM

## 2020-07-13 RX ORDER — DEXTROAMPHETAMINE SACCHARATE, AMPHETAMINE ASPARTATE, DEXTROAMPHETAMINE SULFATE AND AMPHETAMINE SULFATE 5; 5; 5; 5 MG/1; MG/1; MG/1; MG/1
20 TABLET ORAL DAILY
Qty: 30 TAB | Refills: 0 | Status: SHIPPED | OUTPATIENT
Start: 2020-09-20 | End: 2020-10-22 | Stop reason: SDUPTHER

## 2020-07-13 RX ORDER — DEXTROAMPHETAMINE SACCHARATE, AMPHETAMINE ASPARTATE, DEXTROAMPHETAMINE SULFATE AND AMPHETAMINE SULFATE 5; 5; 5; 5 MG/1; MG/1; MG/1; MG/1
20 TABLET ORAL DAILY
Qty: 30 TAB | Refills: 0 | Status: SHIPPED | OUTPATIENT
Start: 2020-08-20 | End: 2020-10-22 | Stop reason: SDUPTHER

## 2020-07-13 RX ORDER — CLONAZEPAM 0.5 MG/1
0.5 TABLET ORAL 2 TIMES DAILY
Qty: 60 TAB | Refills: 2 | Status: SHIPPED | OUTPATIENT
Start: 2020-07-20 | End: 2020-10-22 | Stop reason: SDUPTHER

## 2020-07-13 RX ORDER — DEXTROAMPHETAMINE SACCHARATE, AMPHETAMINE ASPARTATE, DEXTROAMPHETAMINE SULFATE AND AMPHETAMINE SULFATE 5; 5; 5; 5 MG/1; MG/1; MG/1; MG/1
20 TABLET ORAL DAILY
Qty: 30 TAB | Refills: 0 | Status: SHIPPED | OUTPATIENT
Start: 2020-07-20 | End: 2020-10-22 | Stop reason: SDUPTHER

## 2020-07-13 RX ORDER — CITALOPRAM 20 MG/1
40 TABLET, FILM COATED ORAL DAILY
Qty: 60 TAB | Refills: 5 | Status: SHIPPED | OUTPATIENT
Start: 2020-07-13 | End: 2021-01-19 | Stop reason: SDUPTHER

## 2020-07-13 NOTE — PROGRESS NOTES
HISTORY OF PRESENT ILLNESS  Jong Moran is a 36 y.o. female. HPI  She is here today for 3 mo follow up of refills for anxiety and ADD  She is on Adderall 20mg and klonopin bid  Also on celexa 20mg but feels she needs better anxiety control    ROS  A comprehensive review of system was obtained and negative except findings in the HPI    Visit Vitals  /78 (BP 1 Location: Left arm, BP Patient Position: Sitting)   Pulse (!) 118   Temp 98.8 °F (37.1 °C) (Oral)   Resp 18   Ht 5' 3\" (1.6 m)   Wt 116 lb (52.6 kg)   LMP 06/29/2020   BMI 20.55 kg/m²     Physical Exam  Vitals signs and nursing note reviewed. Constitutional:       Appearance: She is well-developed. Comments:      Neck:      Vascular: No JVD. Cardiovascular:      Rate and Rhythm: Normal rate and regular rhythm. Heart sounds: No murmur. No friction rub. No gallop. Pulmonary:      Effort: Pulmonary effort is normal. No respiratory distress. Breath sounds: Normal breath sounds. No wheezing. Skin:     General: Skin is warm. Neurological:      Mental Status: She is alert and oriented to person, place, and time. ASSESSMENT and PLAN  Encounter Diagnoses   Name Primary?  Attention deficit disorder, unspecified hyperactivity presence     Depression with anxiety      Orders Placed This Encounter    dextroamphetamine-amphetamine (ADDERALL) 20 mg tablet    dextroamphetamine-amphetamine (ADDERALL) 20 mg tablet    dextroamphetamine-amphetamine (ADDERALL) 20 mg tablet    clonazePAM (KlonoPIN) 0.5 mg tablet    citalopram (CELEXA) 20 mg tablet     Increased celexa to 40mg a day  Follow up 3 weeks  Refills updated as well    I have discussed the diagnosis with the patient and the intended plan as seen in the above orders. The patient has received an after-visit summary and questions were answered concerning future plans. Patient conveyed understanding of the plan at the time of the visit.     Salazar Longo, MSN, ANP 7/13/2020

## 2020-07-13 NOTE — PROGRESS NOTES
Chief Complaint   Patient presents with    Medication Refill     Pt in office today for med refill  1. Have you been to the ER, urgent care clinic since your last visit? Hospitalized since your last visit? No    2. Have you seen or consulted any other health care providers outside of the [address] [place] [address] since your last visit? Include any pap smears or colon screening.  No     Pt has no other concerns

## 2020-10-22 ENCOUNTER — OFFICE VISIT (OUTPATIENT)
Dept: FAMILY MEDICINE CLINIC | Age: 35
End: 2020-10-22
Payer: MEDICARE

## 2020-10-22 VITALS
HEART RATE: 124 BPM | BODY MASS INDEX: 21.09 KG/M2 | DIASTOLIC BLOOD PRESSURE: 85 MMHG | OXYGEN SATURATION: 100 % | WEIGHT: 119 LBS | SYSTOLIC BLOOD PRESSURE: 121 MMHG | RESPIRATION RATE: 18 BRPM | TEMPERATURE: 98.2 F | HEIGHT: 63 IN

## 2020-10-22 DIAGNOSIS — F41.8 DEPRESSION WITH ANXIETY: ICD-10-CM

## 2020-10-22 DIAGNOSIS — F98.8 ATTENTION DEFICIT DISORDER, UNSPECIFIED HYPERACTIVITY PRESENCE: ICD-10-CM

## 2020-10-22 PROCEDURE — G9717 DOC PT DX DEP/BP F/U NT REQ: HCPCS | Performed by: NURSE PRACTITIONER

## 2020-10-22 PROCEDURE — G0463 HOSPITAL OUTPT CLINIC VISIT: HCPCS | Performed by: NURSE PRACTITIONER

## 2020-10-22 PROCEDURE — 99213 OFFICE O/P EST LOW 20 MIN: CPT | Performed by: NURSE PRACTITIONER

## 2020-10-22 PROCEDURE — G8420 CALC BMI NORM PARAMETERS: HCPCS | Performed by: NURSE PRACTITIONER

## 2020-10-22 PROCEDURE — G8427 DOCREV CUR MEDS BY ELIG CLIN: HCPCS | Performed by: NURSE PRACTITIONER

## 2020-10-22 RX ORDER — DEXTROAMPHETAMINE SACCHARATE, AMPHETAMINE ASPARTATE, DEXTROAMPHETAMINE SULFATE AND AMPHETAMINE SULFATE 5; 5; 5; 5 MG/1; MG/1; MG/1; MG/1
20 TABLET ORAL DAILY
Qty: 30 TAB | Refills: 0 | Status: SHIPPED | OUTPATIENT
Start: 2020-12-22 | End: 2021-01-19 | Stop reason: SDUPTHER

## 2020-10-22 RX ORDER — DEXTROAMPHETAMINE SACCHARATE, AMPHETAMINE ASPARTATE, DEXTROAMPHETAMINE SULFATE AND AMPHETAMINE SULFATE 5; 5; 5; 5 MG/1; MG/1; MG/1; MG/1
20 TABLET ORAL DAILY
Qty: 30 TAB | Refills: 0 | Status: SHIPPED | OUTPATIENT
Start: 2020-11-22 | End: 2021-01-19 | Stop reason: SDUPTHER

## 2020-10-22 RX ORDER — DEXTROAMPHETAMINE SACCHARATE, AMPHETAMINE ASPARTATE, DEXTROAMPHETAMINE SULFATE AND AMPHETAMINE SULFATE 5; 5; 5; 5 MG/1; MG/1; MG/1; MG/1
20 TABLET ORAL DAILY
Qty: 30 TAB | Refills: 0 | Status: SHIPPED | OUTPATIENT
Start: 2020-10-22 | End: 2021-01-19 | Stop reason: SDUPTHER

## 2020-10-22 RX ORDER — CLONAZEPAM 0.5 MG/1
0.5 TABLET ORAL 2 TIMES DAILY
Qty: 60 TAB | Refills: 2 | Status: SHIPPED | OUTPATIENT
Start: 2020-10-22 | End: 2021-01-19 | Stop reason: SDUPTHER

## 2020-10-22 NOTE — PROGRESS NOTES
Chief Complaint   Patient presents with    Medication Refill     Pt in office today for med refill    1. Have you been to the ER, urgent care clinic since your last visit? Hospitalized since your last visit? No    2. Have you seen or consulted any other health care providers outside of the [address] [place] [address] since your last visit? Include any pap smears or colon screening.  No     Pt has no other concerns

## 2020-10-22 NOTE — PROGRESS NOTES
HISTORY OF PRESENT ILLNESS  Alicia Silva is a 30 y.o. female. HPI  Patient returns to office for follow up ADD. Stable on medication without weight loss, decreased appetite, insomnia, or tremor. Good focus control. Gets three months of scripts at a time. See med order for dose. She is also due for her refill of klonopin for anxiety  No early refill requests    ROS  A comprehensive review of system was obtained and negative except findings in the HPI    Visit Vitals  /85 (BP 1 Location: Right arm, BP Patient Position: Sitting)   Pulse (!) 124   Temp 98.2 °F (36.8 °C) (Oral)   Resp 18   Ht 5' 3\" (1.6 m)   Wt 119 lb (54 kg)   LMP 10/22/2020   SpO2 100%   BMI 21.08 kg/m²     Physical Exam  Vitals signs and nursing note reviewed. Constitutional:       Appearance: She is well-developed. Comments:      Neck:      Vascular: No JVD. Cardiovascular:      Rate and Rhythm: Normal rate and regular rhythm. Heart sounds: No murmur. No friction rub. No gallop. Pulmonary:      Effort: Pulmonary effort is normal. No respiratory distress. Breath sounds: Normal breath sounds. No wheezing. Skin:     General: Skin is warm. Neurological:      Mental Status: She is alert and oriented to person, place, and time. ASSESSMENT and PLAN  Encounter Diagnoses   Name Primary?  Attention deficit disorder, unspecified hyperactivity presence     Depression with anxiety      Orders Placed This Encounter    dextroamphetamine-amphetamine (ADDERALL) 20 mg tablet    dextroamphetamine-amphetamine (ADDERALL) 20 mg tablet    dextroamphetamine-amphetamine (ADDERALL) 20 mg tablet    clonazePAM (KlonoPIN) 0.5 mg tablet     Refills updated today  Follow up 3 mo    I have discussed the diagnosis with the patient and the intended plan as seen in the above orders. The patient has received an after-visit summary and questions were answered concerning future plans.  Patient conveyed understanding of the plan at the time of the visit.     Juvenal Leon, MSN, ANP  10/22/2020

## 2021-01-19 ENCOUNTER — OFFICE VISIT (OUTPATIENT)
Dept: FAMILY MEDICINE CLINIC | Age: 36
End: 2021-01-19
Payer: MEDICARE

## 2021-01-19 VITALS
HEIGHT: 63 IN | BODY MASS INDEX: 21.97 KG/M2 | TEMPERATURE: 98.9 F | WEIGHT: 124 LBS | RESPIRATION RATE: 18 BRPM | DIASTOLIC BLOOD PRESSURE: 63 MMHG | OXYGEN SATURATION: 98 % | HEART RATE: 117 BPM | SYSTOLIC BLOOD PRESSURE: 105 MMHG

## 2021-01-19 DIAGNOSIS — F98.8 ATTENTION DEFICIT DISORDER, UNSPECIFIED HYPERACTIVITY PRESENCE: ICD-10-CM

## 2021-01-19 DIAGNOSIS — F41.8 DEPRESSION WITH ANXIETY: ICD-10-CM

## 2021-01-19 PROCEDURE — G9717 DOC PT DX DEP/BP F/U NT REQ: HCPCS | Performed by: NURSE PRACTITIONER

## 2021-01-19 PROCEDURE — G8420 CALC BMI NORM PARAMETERS: HCPCS | Performed by: NURSE PRACTITIONER

## 2021-01-19 PROCEDURE — 99213 OFFICE O/P EST LOW 20 MIN: CPT | Performed by: NURSE PRACTITIONER

## 2021-01-19 PROCEDURE — G8427 DOCREV CUR MEDS BY ELIG CLIN: HCPCS | Performed by: NURSE PRACTITIONER

## 2021-01-19 RX ORDER — CLONAZEPAM 0.5 MG/1
0.5 TABLET ORAL 2 TIMES DAILY
Qty: 60 TAB | Refills: 2 | Status: SHIPPED | OUTPATIENT
Start: 2021-01-19 | End: 2021-05-12 | Stop reason: SDUPTHER

## 2021-01-19 RX ORDER — DEXTROAMPHETAMINE SACCHARATE, AMPHETAMINE ASPARTATE, DEXTROAMPHETAMINE SULFATE AND AMPHETAMINE SULFATE 5; 5; 5; 5 MG/1; MG/1; MG/1; MG/1
20 TABLET ORAL DAILY
Qty: 30 TAB | Refills: 0 | Status: SHIPPED | OUTPATIENT
Start: 2021-01-22 | End: 2021-05-12 | Stop reason: SDUPTHER

## 2021-01-19 RX ORDER — DEXTROAMPHETAMINE SACCHARATE, AMPHETAMINE ASPARTATE, DEXTROAMPHETAMINE SULFATE AND AMPHETAMINE SULFATE 5; 5; 5; 5 MG/1; MG/1; MG/1; MG/1
20 TABLET ORAL DAILY
Qty: 30 TAB | Refills: 0 | Status: SHIPPED | OUTPATIENT
Start: 2021-02-22 | End: 2021-05-12 | Stop reason: SDUPTHER

## 2021-01-19 RX ORDER — CITALOPRAM 20 MG/1
40 TABLET, FILM COATED ORAL DAILY
Qty: 60 TAB | Refills: 5 | Status: SHIPPED | OUTPATIENT
Start: 2021-01-19 | End: 2021-08-11 | Stop reason: SDUPTHER

## 2021-01-19 RX ORDER — DEXTROAMPHETAMINE SACCHARATE, AMPHETAMINE ASPARTATE, DEXTROAMPHETAMINE SULFATE AND AMPHETAMINE SULFATE 5; 5; 5; 5 MG/1; MG/1; MG/1; MG/1
20 TABLET ORAL DAILY
Qty: 30 TAB | Refills: 0 | Status: SHIPPED | OUTPATIENT
Start: 2021-03-22 | End: 2021-05-12 | Stop reason: SDUPTHER

## 2021-01-19 NOTE — PROGRESS NOTES
Chief Complaint   Patient presents with    Medication Refill     Pt in office today for med refill    1. Have you been to the ER, urgent care clinic since your last visit? Hospitalized since your last visit? No    2. Have you seen or consulted any other health care providers outside of the [address] [place] [address] since your last visit? Include any pap smears or colon screening.  No     Pt has no other concerns

## 2021-01-21 NOTE — PROGRESS NOTES
HISTORY OF PRESENT ILLNESS  Kurt Titus is a 30 y.o. female. HPI  Patient returns to office for follow up ADD. Stable on medication without weight loss, decreased appetite, insomnia, or tremor. Good focus control. Gets three months of scripts at a time. See med order for dose. She is also due for her anxiety med refills  Still living with her father, doing well, clean from drugs    ROS  A comprehensive review of system was obtained and negative except findings in the HPI    Visit Vitals  /63 (BP 1 Location: Left arm, BP Patient Position: Sitting)   Pulse (!) 117   Temp 98.9 °F (37.2 °C) (Oral)   Resp 18   Ht 5' 3\" (1.6 m)   Wt 124 lb (56.2 kg)   LMP 12/25/2020   SpO2 98%   BMI 21.97 kg/m²     Physical Exam  Vitals signs and nursing note reviewed. Constitutional:       Appearance: She is well-developed. Comments:      Neck:      Vascular: No JVD. Cardiovascular:      Rate and Rhythm: Normal rate and regular rhythm. Heart sounds: No murmur. No friction rub. No gallop. Pulmonary:      Effort: Pulmonary effort is normal. No respiratory distress. Breath sounds: Normal breath sounds. No wheezing. Skin:     General: Skin is warm. Neurological:      Mental Status: She is alert and oriented to person, place, and time. ASSESSMENT and PLAN  Encounter Diagnoses   Name Primary?  Attention deficit disorder, unspecified hyperactivity presence     Depression with anxiety      Orders Placed This Encounter    dextroamphetamine-amphetamine (ADDERALL) 20 mg tablet    dextroamphetamine-amphetamine (ADDERALL) 20 mg tablet    dextroamphetamine-amphetamine (ADDERALL) 20 mg tablet    clonazePAM (KlonoPIN) 0.5 mg tablet    citalopram (CELEXA) 20 mg tablet     Refills updated today  Follow up 3 mo  I have discussed the diagnosis with the patient and the intended plan as seen in the above orders.  The patient has received an after-visit summary and questions were answered concerning future plans. Patient conveyed understanding of the plan at the time of the visit.     Sander Aranda, MSN, ANP  1/21/2021

## 2021-03-19 ENCOUNTER — DOCUMENTATION ONLY (OUTPATIENT)
Dept: FAMILY MEDICINE CLINIC | Age: 36
End: 2021-03-19

## 2021-05-12 ENCOUNTER — VIRTUAL VISIT (OUTPATIENT)
Dept: FAMILY MEDICINE CLINIC | Age: 36
End: 2021-05-12
Payer: MEDICARE

## 2021-05-12 DIAGNOSIS — F41.8 DEPRESSION WITH ANXIETY: ICD-10-CM

## 2021-05-12 DIAGNOSIS — F98.8 ATTENTION DEFICIT DISORDER, UNSPECIFIED HYPERACTIVITY PRESENCE: ICD-10-CM

## 2021-05-12 PROCEDURE — G8427 DOCREV CUR MEDS BY ELIG CLIN: HCPCS | Performed by: NURSE PRACTITIONER

## 2021-05-12 PROCEDURE — G9717 DOC PT DX DEP/BP F/U NT REQ: HCPCS | Performed by: NURSE PRACTITIONER

## 2021-05-12 PROCEDURE — 99213 OFFICE O/P EST LOW 20 MIN: CPT | Performed by: NURSE PRACTITIONER

## 2021-05-12 RX ORDER — DEXTROAMPHETAMINE SACCHARATE, AMPHETAMINE ASPARTATE, DEXTROAMPHETAMINE SULFATE AND AMPHETAMINE SULFATE 5; 5; 5; 5 MG/1; MG/1; MG/1; MG/1
20 TABLET ORAL DAILY
Qty: 30 TAB | Refills: 0 | Status: SHIPPED | OUTPATIENT
Start: 2021-07-12 | End: 2021-08-11 | Stop reason: SDUPTHER

## 2021-05-12 RX ORDER — DEXTROAMPHETAMINE SACCHARATE, AMPHETAMINE ASPARTATE, DEXTROAMPHETAMINE SULFATE AND AMPHETAMINE SULFATE 5; 5; 5; 5 MG/1; MG/1; MG/1; MG/1
20 TABLET ORAL DAILY
Qty: 30 TAB | Refills: 0 | Status: SHIPPED | OUTPATIENT
Start: 2021-06-12 | End: 2021-08-11 | Stop reason: SDUPTHER

## 2021-05-12 RX ORDER — DEXTROAMPHETAMINE SACCHARATE, AMPHETAMINE ASPARTATE, DEXTROAMPHETAMINE SULFATE AND AMPHETAMINE SULFATE 5; 5; 5; 5 MG/1; MG/1; MG/1; MG/1
20 TABLET ORAL DAILY
Qty: 30 TAB | Refills: 0 | Status: SHIPPED | OUTPATIENT
Start: 2021-05-12 | End: 2021-08-11 | Stop reason: SDUPTHER

## 2021-05-12 RX ORDER — CLONAZEPAM 0.5 MG/1
0.5 TABLET ORAL 2 TIMES DAILY
Qty: 60 TAB | Refills: 2 | Status: SHIPPED | OUTPATIENT
Start: 2021-05-12 | End: 2021-08-11 | Stop reason: SDUPTHER

## 2021-05-12 NOTE — PROGRESS NOTES
Amira Watkins (: 1985) is a 30 y.o. female, established patient, here for evaluation of the following chief complaint(s):   Medication Refill       SUBJECTIVE/OBJECTIVE:  HPI  Patient returns to office for follow up ADD. Stable on medication without weight loss, decreased appetite, insomnia, or tremor. Good focus control. Gets three months of scripts at a time. See med order for dose. She is also due for refills of her klonopin that she takes bid    Review of Systems   A comprehensive review of system was obtained and negative except findings in the HPI    No flowsheet data found.     Physical Exam    [INSTRUCTIONS:  \"[x]\" Indicates a positive item  \"[]\" Indicates a negative item  -- DELETE ALL ITEMS NOT EXAMINED]    Constitutional: [x] Appears well-developed and well-nourished [x] No apparent distress      [] Abnormal -     Mental status: [x] Alert and awake  [x] Oriented to person/place/time [x] Able to follow commands    [] Abnormal -     Eyes:   EOM    [x]  Normal    [] Abnormal -   Sclera  [x]  Normal    [] Abnormal -          Discharge [x]  None visible   [] Abnormal -     HENT: [x] Normocephalic, atraumatic  [] Abnormal -   [x] Mouth/Throat: Mucous membranes are moist    External Ears [x] Normal  [] Abnormal -    Neck: [x] No visualized mass [] Abnormal -     Pulmonary/Chest: [x] Respiratory effort normal   [x] No visualized signs of difficulty breathing or respiratory distress        [] Abnormal -      Musculoskeletal:   [x] Normal gait with no signs of ataxia         [x] Normal range of motion of neck        [] Abnormal -     Neurological:        [x] No Facial Asymmetry (Cranial nerve 7 motor function) (limited exam due to video visit)          [x] No gaze palsy        [] Abnormal -          Skin:        [x] No significant exanthematous lesions or discoloration noted on facial skin         [] Abnormal -            Psychiatric:       [x] Normal Affect [] Abnormal -        [x] No Hallucinations    Other pertinent observable physical exam findings:- none    Diagnoses and all orders for this visit:    1. Attention deficit disorder, unspecified hyperactivity presence  -     dextroamphetamine-amphetamine (ADDERALL) 20 mg tablet; Take 1 Tab by mouth daily. Max Daily Amount: 20 mg. - must last 30 days  -     dextroamphetamine-amphetamine (ADDERALL) 20 mg tablet; Take 1 Tab by mouth daily. - must last 30 days  -     dextroamphetamine-amphetamine (ADDERALL) 20 mg tablet; Take 1 Tab by mouth daily. - must last 30 days    2. Depression with anxiety  -     clonazePAM (KlonoPIN) 0.5 mg tablet; Take 1 Tab by mouth two (2) times a day. As needed for anxiety - must last 30 days      All refills updated today  Follow up 3 mo  Adr/se of meds reviewed        On this date 05/12/2021 I have spent 15 minutes reviewing previous notes, test results and face to face (virtual) with the patient discussing the diagnosis and importance of compliance with the treatment plan as well as documenting on the day of the visit. Shyam Jose was evaluated through a synchronous (real-time) audio-video encounter. The patient (or guardian if applicable) is aware that this is a billable service. Verbal consent to proceed has been obtained within the past 12 months. The visit was conducted pursuant to the emergency declaration under the [address] [place] [address], [address] [place] [address] authority and the [employer] and [employer] Act. Patient identification was verified, and a caregiver was present when appropriate. The patient was located in a state where the provider was credentialed to provide care. An electronic signature was used to authenticate this note.   -- Carrillo Martinez NP

## 2021-05-12 NOTE — PROGRESS NOTES
Chief Complaint   Patient presents with    Medication Refill     Pt being seen for med refill    1. Have you been to the ER, urgent care clinic since your last visit? Hospitalized since your last visit? No    2. Have you seen or consulted any other health care providers outside of the [address] [place] [address] since your last visit? Include any pap smears or colon screening.  No     Pt has no other concerns

## 2021-08-11 ENCOUNTER — VIRTUAL VISIT (OUTPATIENT)
Dept: FAMILY MEDICINE CLINIC | Age: 36
End: 2021-08-11
Payer: MEDICARE

## 2021-08-11 DIAGNOSIS — F41.8 DEPRESSION WITH ANXIETY: ICD-10-CM

## 2021-08-11 DIAGNOSIS — F98.8 ATTENTION DEFICIT DISORDER, UNSPECIFIED HYPERACTIVITY PRESENCE: ICD-10-CM

## 2021-08-11 PROCEDURE — G9717 DOC PT DX DEP/BP F/U NT REQ: HCPCS | Performed by: NURSE PRACTITIONER

## 2021-08-11 PROCEDURE — 99213 OFFICE O/P EST LOW 20 MIN: CPT | Performed by: NURSE PRACTITIONER

## 2021-08-11 PROCEDURE — G8427 DOCREV CUR MEDS BY ELIG CLIN: HCPCS | Performed by: NURSE PRACTITIONER

## 2021-08-11 RX ORDER — CITALOPRAM 20 MG/1
40 TABLET, FILM COATED ORAL DAILY
Qty: 60 TABLET | Refills: 5 | Status: SHIPPED | OUTPATIENT
Start: 2021-08-11 | End: 2022-06-13 | Stop reason: SDUPTHER

## 2021-08-11 RX ORDER — DEXTROAMPHETAMINE SACCHARATE, AMPHETAMINE ASPARTATE, DEXTROAMPHETAMINE SULFATE AND AMPHETAMINE SULFATE 5; 5; 5; 5 MG/1; MG/1; MG/1; MG/1
20 TABLET ORAL DAILY
Qty: 30 TABLET | Refills: 0 | Status: SHIPPED | OUTPATIENT
Start: 2021-08-11 | End: 2022-06-13 | Stop reason: SDUPTHER

## 2021-08-11 RX ORDER — CLONAZEPAM 0.5 MG/1
0.5 TABLET ORAL 2 TIMES DAILY
Qty: 60 TABLET | Refills: 2 | Status: SHIPPED | OUTPATIENT
Start: 2021-08-11 | End: 2022-06-13 | Stop reason: SDUPTHER
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
## 2021-11-18 ENCOUNTER — OFFICE VISIT (OUTPATIENT)
Dept: FAMILY MEDICINE CLINIC | Age: 36
End: 2021-11-18
Payer: COMMERCIAL

## 2021-11-18 ENCOUNTER — TELEPHONE (OUTPATIENT)
Dept: FAMILY MEDICINE CLINIC | Age: 36
End: 2021-11-18

## 2021-11-18 VITALS
TEMPERATURE: 98.1 F | SYSTOLIC BLOOD PRESSURE: 105 MMHG | HEIGHT: 63 IN | BODY MASS INDEX: 21.79 KG/M2 | OXYGEN SATURATION: 98 % | WEIGHT: 123 LBS | HEART RATE: 92 BPM | DIASTOLIC BLOOD PRESSURE: 70 MMHG

## 2021-11-18 DIAGNOSIS — F98.8 ATTENTION DEFICIT DISORDER, UNSPECIFIED HYPERACTIVITY PRESENCE: Primary | ICD-10-CM

## 2021-11-18 DIAGNOSIS — R51.9 NONINTRACTABLE HEADACHE, UNSPECIFIED CHRONICITY PATTERN, UNSPECIFIED HEADACHE TYPE: ICD-10-CM

## 2021-11-18 DIAGNOSIS — N91.2 AMENORRHEA: ICD-10-CM

## 2021-11-18 PROCEDURE — 99213 OFFICE O/P EST LOW 20 MIN: CPT | Performed by: FAMILY MEDICINE

## 2021-11-18 NOTE — TELEPHONE ENCOUNTER
She has an appt with Dr. Jose Garrett, walked out mid appt with JOLANTA Ramos without warning or notification.  Must keep appt made with Dr. Jose Garrett for eval. July Franco

## 2021-11-18 NOTE — TELEPHONE ENCOUNTER
Attempted to reach patient by calling 4 numbers listed on her chart after patient left office prior to discussing treatment plan. However I was unable to reach patient. Ria LIZAMA for return call.

## 2021-11-18 NOTE — TELEPHONE ENCOUNTER
----- Message from Frediluz Fito sent at 11/18/2021  3:30 PM EST -----  Subject: Message to Provider    QUESTIONS  Information for Provider? Patient was in for an [address] on 11/18 with Zane Harrington and didn't complete her appt. She called to schedule another OV   with Dr. Basilio Fair but would really like to speak with [facility] as soon   as possible.   ---------------------------------------------------------------------------  --------------  Jodi BLACK  What is the best way for the office to contact you? OK to leave message on   voicemail  Preferred Call Back Phone Number? [phone]  ---------------------------------------------------------------------------  --------------  SCRIPT ANSWERS  Relationship to Patient?  Self

## 2021-11-18 NOTE — PROGRESS NOTES
Nori Franco (: 1985) is a 29 y.o. female, established patient, here for evaluation of the following chief complaint(s):  Medication Refill and Headache         ASSESSMENT/PLAN:  Below is the assessment and plan developed based on review of pertinent history, physical exam, labs, studies, and medications. 1. Attention deficit disorder, unspecified hyperactivity presence  Plan was to discontinue adderall for now due to headaches. However when I returned to patient room to discuss treatment plan the patient had left the office. I was unable to contact patient by calling phone numbers listed on her chart. patient left the office prior to further discussing treatment plan. 2. Nonintractable headache, unspecified chronicity pattern, unspecified headache type  DDX includes tension type headaches secondary to stress, migraines, hyperthyroidism, anemia. Plan was to check labs including a CBC, CMP and TSH as well as have patient d/c adderall and continue baclofen PRN. However, when I returned to the patient room to discuss the plan with the patient she was no longer in the exam room and had left the office. I called contact numbers listed on chart but was unable to get in contact with the patient. 3. Amenorrhea  Plan was to advise patient to follow up with OBGYN given history of abnormal pap with high risk HPV and current amenorrhea however patient left office before I was able to discuss this with her. No follow-ups on file. SUBJECTIVE/OBJECTIVE:  Chief Complaint   Patient presents with    Medication Refill    Headache     Patient presents to the office to discuss medication refills. Patient states she has had headaches for many years. However in the last 6 months these have been worsening. Patient states at midday she develops a pulsating headache in bilateral temples. These can be mild to severe in nature. They can be associated with light sensitivity and sound sensitivity.  She will occasionally see black floaters in her vision. Reports nausea with severe headaches. Patient states when she takes the Adderall her headaches are more severe. She stopped the adderall 3 days ago and reports headaches have continued although they are not as severe in nature. Patient notes high stress situation at home with 2 children who are disrespectful and very hard to parent. Denies dysarthria, weakness, sensory changes, confusion, dizziness. Denies chest pain, shortness of breath, tremors. She has taken baclofen in the past for tension headaches but does not have any of this left. She takes tylenol for current headaches which makes them better but they do not resolve. Patient reports she does have blurry vision and wears glasses but has not worn her glasses in 6 months. Headaches worse when she does not wear glasses. Patient also notes she has not had a period in 1 year. She has a history of a tubal ligation as well as an abnormal pap with high risk HPV. She has not seen OBGYN in several years. Review of systems negative other than mentioned in HPI    Current Outpatient Medications on File Prior to Visit   Medication Sig Dispense Refill    citalopram (CELEXA) 20 mg tablet Take 2 Tablets by mouth daily. 60 Tablet 5    clonazePAM (KlonoPIN) 0.5 mg tablet Take 1 Tablet by mouth two (2) times a day. As needed for anxiety - must last 30 days 60 Tablet 2    dextroamphetamine-amphetamine (ADDERALL) 20 mg tablet Take 1 Tablet by mouth daily. Max Daily Amount: 20 mg. - must last 30 days 30 Tablet 0    dextroamphetamine-amphetamine (ADDERALL) 20 mg tablet Take 1 Tablet by mouth daily. - must last 30 days 30 Tablet 0    dextroamphetamine-amphetamine (ADDERALL) 20 mg tablet Take 1 Tablet by mouth daily. - must last 30 days 30 Tablet 0    baclofen (LIORESAL) 10 mg tablet Take 1 Tab by mouth three (3) times daily.  As needed for tension headache (Patient not taking: Reported on 5/12/2021) 60 Tab 1     No current facility-administered medications on file prior to visit. Patient Active Problem List    Diagnosis Date Noted    Severe depression ([facility] 07/23/2018    HPV (human papillomavirus) 05/20/2010    Depression with anxiety 05/20/2010    ADD (attention deficit disorder) 05/20/2010    Schizoaffective disorder, bipolar type ([facility] 05/20/2010    ETOH abuse 05/20/2010    Narcotic abuse ([facility] 05/20/2010       Vitals:    11/18/21 1406   BP: 105/70   Pulse: 92   Temp: 98.1 °F (36.7 °C)   TempSrc: Oral   SpO2: 98%   Weight: 123 lb (55.8 kg)   Height: 5' 3\" (1.6 m)   PainSc:   0 - No pain        Physical Exam  Constitutional:       Appearance: Normal appearance. HENT:      Head: Normocephalic and atraumatic. Eyes:      Extraocular Movements: Extraocular movements intact. Conjunctiva/sclera: Conjunctivae normal.      Pupils: Pupils are equal, round, and reactive to light. Cardiovascular:      Rate and Rhythm: Normal rate and regular rhythm. Pulses: Normal pulses. Heart sounds: Normal heart sounds. Pulmonary:      Effort: Pulmonary effort is normal.      Breath sounds: Normal breath sounds. Abdominal:      General: Abdomen is flat. Bowel sounds are normal.      Palpations: Abdomen is soft. Neurological:      General: No focal deficit present. Mental Status: She is alert and oriented to person, place, and time. Cranial Nerves: No cranial nerve deficit. Sensory: No sensory deficit. Motor: No weakness. Coordination: Coordination normal.      Gait: Gait normal.   Psychiatric:         Mood and Affect: Mood is anxious. Behavior: Behavior normal.             An electronic signature was used to authenticate this note.   -- Karen De La Fuente PA-C

## 2021-11-18 NOTE — TELEPHONE ENCOUNTER
----- Message from Jesica Argueta sent at 11/18/2021  3:37 PM EST -----  Subject: Medication Problem    QUESTIONS  Name of Medication? dextroamphetamine-amphetamine (ADDERALL) 20 mg tablet  Patient-reported dosage and instructions? 20mg   What question or problem do you have with the medication? Patient does NOT   take this medication daily. She believes it's causing headaches. Patient   had a hard time articulating what's going on and just wants the headaches   to go away. Preferred Pharmacy? [id number] phone number (if available)? [phone]  Additional Information for Provider? for whatever it's worth, the patient   had to ask her dad what pharmacy they use and had a hard time verifying   phone #'s and communication preferences. ---------------------------------------------------------------------------  --------------  H.BLOOM  What is the best way for the office to contact you? OK to leave message on   voicemail  Preferred Call Back Phone Number? [phone]  ---------------------------------------------------------------------------  --------------  SCRIPT ANSWERS  Relationship to Patient?  Self Discharge Summary - Pediatrics  Sonya Roth 16  y o  1  m o  male MRN: [id number]  Unit/Bed#: [facility] 861-01 Encounter: [id number]    Admission Date:    Admission Orders (From admission, onward)     Ordered        02/21/20 0002  Inpatient Admission  Once                   Discharge Date: 3/5/2020  Diagnosis:   Patient Active Problem List    Diagnosis Date Noted    Rash and nonspecific skin eruption 02/21/2020    Myocarditis ([address] ) 02/21/2020         Resolved Problems  Date Reviewed: 2/25/2020          Resolved    Dehydration 2/25/2020     Resolved by  Charis Garcia MD    Acute hypotension 2/25/2020     Resolved by  Charis Garcia MD          Procedures Performed:   Orders Placed This Encounter   Procedures   [facility] Course: Sonya Roth is a 16 y o  male admitted to PICU on 02/21/20 w/ hypotension s/p IVF resusitation 2/2 Sepsis/dehydration POA 2/2 H1N1 & viral myocarditis w/ CXR significant for cardiomegaly, elevated BNP and elevated trops, s/p Milrinone, Lasix, epi GTT  Followed by ID, completed course of tamiflu and baloxavir  Noted to have AV junctional arrhythmia, elevated BNP, also evaluated by cardiology, not concerned about rhythm strip disturbance  Required supplemental oxygen  Noted to have thrombocytopenia, resolved  Evaluated by PT/OT, okay to dc  Cardiology okay'd to dc  Complications: none    Condition at Discharge: stable         Discharge instructions/Information to patient and family:   See after visit summary for information provided to patient and family  Provisions for Follow-Up Care:  See after visit summary for information related to follow-up care and any pertinent home health orders  Disposition: Home    Discharge Statement   I spent 30 minutes discharging the patient  This time was spent on the day of discharge  I had direct contact with the patient on the day of discharge   Additional documentation is required if more than 30 minutes were spent on discharge  Discharge Medications:  See after visit summary for reconciled discharge medications provided to patient and family

## 2021-11-18 NOTE — PROGRESS NOTES
Ran Lawrence is a 29 y.o. female , id x 2(name and ). Reviewed record, history, and  medications. Chief Complaint   Patient presents with    Medication Refill    Headache       Vitals:    21 1406   BP: 105/70   Pulse: 92   Temp: 98.1 °F (36.7 °C)   TempSrc: Oral   SpO2: 98%   Weight: 123 lb (55.8 kg)   Height: 5' 3\" (1.6 m)       Coordination of Care Questionnaire:   1) Have you been to an emergency room, urgent care, or hospitalized since your last visit?   no       2. Have seen or consulted any other health care provider since your last visit? YES      3 most recent PHQ Screens 2021   PHQ Not Done Active Diagnosis of Depression or Bipolar Disorder   Little interest or pleasure in doing things -   Feeling down, depressed, irritable, or hopeless -   Total Score PHQ 2 -       Patient is accompanied by self I have received verbal consent from Ran Lawrence to discuss any/all medical information while they are present in the room.

## 2021-11-19 ENCOUNTER — TELEPHONE (OUTPATIENT)
Dept: FAMILY MEDICINE CLINIC | Age: 36
End: 2021-11-19

## 2021-11-19 NOTE — TELEPHONE ENCOUNTER
----- Message from Bautista Cobb sent at 11/19/2021  1:08 PM EST -----  Subject: Message to Provider    QUESTIONS  Information for Provider? PT was in on 11/18/21 ; PT did not like the way   the  was treating her she felt like she was being interrogated. Pt needs   medication refills for her headaches. PT does have a learning disability. She felt very un heard and would like to speak to Laila William.  ---------------------------------------------------------------------------  --------------  [address]  What is the best way for the office to contact you? OK to leave message on   voicemail  Preferred Call Back Phone Number? [phone]  ---------------------------------------------------------------------------  --------------  SCRIPT ANSWERS  Relationship to Patient?  Self

## 2021-11-21 NOTE — TELEPHONE ENCOUNTER
She walked out on [facility] during a work up. She is going to have to be seen again to do any kind of refills. This has nothing to do with her learning disability.  Zaida Olguin

## 2022-03-18 PROBLEM — F32.2 SEVERE DEPRESSION (HCC): Status: ACTIVE | Noted: 2018-07-23

## 2022-06-13 ENCOUNTER — VIRTUAL VISIT (OUTPATIENT)
Dept: FAMILY MEDICINE CLINIC | Age: 37
End: 2022-06-13
Payer: MEDICARE

## 2022-06-13 ENCOUNTER — DOCUMENTATION ONLY (OUTPATIENT)
Dept: FAMILY MEDICINE CLINIC | Age: 37
End: 2022-06-13

## 2022-06-13 DIAGNOSIS — F33.9 RECURRENT DEPRESSION (HCC): Primary | ICD-10-CM

## 2022-06-13 DIAGNOSIS — F98.8 ATTENTION DEFICIT DISORDER, UNSPECIFIED HYPERACTIVITY PRESENCE: ICD-10-CM

## 2022-06-13 DIAGNOSIS — F41.1 GAD (GENERALIZED ANXIETY DISORDER): ICD-10-CM

## 2022-06-13 PROCEDURE — G9717 DOC PT DX DEP/BP F/U NT REQ: HCPCS | Performed by: NURSE PRACTITIONER

## 2022-06-13 PROCEDURE — 99214 OFFICE O/P EST MOD 30 MIN: CPT | Performed by: NURSE PRACTITIONER

## 2022-06-13 PROCEDURE — G8427 DOCREV CUR MEDS BY ELIG CLIN: HCPCS | Performed by: NURSE PRACTITIONER

## 2022-06-13 RX ORDER — CLONAZEPAM 0.5 MG/1
0.5 TABLET ORAL 2 TIMES DAILY
Qty: 60 TABLET | Refills: 2 | Status: SHIPPED | OUTPATIENT
Start: 2022-06-13

## 2022-06-13 RX ORDER — DEXTROAMPHETAMINE SACCHARATE, AMPHETAMINE ASPARTATE, DEXTROAMPHETAMINE SULFATE AND AMPHETAMINE SULFATE 5; 5; 5; 5 MG/1; MG/1; MG/1; MG/1
20 TABLET ORAL DAILY
Qty: 30 TABLET | Refills: 0 | Status: SHIPPED | OUTPATIENT
Start: 2022-08-08

## 2022-06-13 RX ORDER — CITALOPRAM 20 MG/1
40 TABLET, FILM COATED ORAL DAILY
Qty: 60 TABLET | Refills: 2 | Status: SHIPPED | OUTPATIENT
Start: 2022-06-13 | End: 2022-09-05

## 2022-06-13 RX ORDER — DEXTROAMPHETAMINE SACCHARATE, AMPHETAMINE ASPARTATE, DEXTROAMPHETAMINE SULFATE AND AMPHETAMINE SULFATE 5; 5; 5; 5 MG/1; MG/1; MG/1; MG/1
20 TABLET ORAL DAILY
Qty: 30 TABLET | Refills: 0 | Status: SHIPPED | OUTPATIENT
Start: 2022-07-11

## 2022-06-13 RX ORDER — DEXTROAMPHETAMINE SACCHARATE, AMPHETAMINE ASPARTATE, DEXTROAMPHETAMINE SULFATE AND AMPHETAMINE SULFATE 5; 5; 5; 5 MG/1; MG/1; MG/1; MG/1
20 TABLET ORAL DAILY
Qty: 30 TABLET | Refills: 0 | Status: SHIPPED | OUTPATIENT
Start: 2022-06-13

## 2022-06-14 NOTE — PATIENT INSTRUCTIONS
Recovering From Depression: Care Instructions  Your Care Instructions     Taking good care of yourself is important as you recover from depression. In time, your symptoms will fade as your treatment takes hold. Do not give up. Instead, focus your energy on getting better. Your mood will improve. It just takes some time. Focus on things that can help you feel better, such as being with friends and family, eating well, and getting enough rest. But take things slowly. Do not do too much too soon. You will begin to feel better gradually. Follow-up care is a key part of your treatment and safety. Be sure to make and go to all appointments, and call your doctor if you are having problems. It's also a good idea to know your test results and keep a list of the medicines you take. How can you care for yourself at home? Be realistic  · If you have a large task to do, break it up into smaller steps you can handle, and just do what you can. · You may want to put off important decisions until your depression has lifted. If you have plans that will have a major impact on your life, such as marriage, divorce, or a job change, try to wait a bit. Talk it over with friends and loved ones who can help you look at the overall picture first.  · Reaching out to people for help is important. Do not isolate yourself. Let your family and friends help you. Find someone you can trust and confide in, and talk to that person. · Be patient, and be kind to yourself. Remember that depression is not your fault and is not something you can overcome with willpower alone. Treatment is important for depression, just like for any other illness. Feeling better takes time, and your mood will improve little by little. Stay active  · Stay busy and get outside. Take a walk, or try some other light exercise. · Talk with your doctor about an exercise program. Exercise can help with mild depression. · Go to a movie or concert.  Take part in a Confucianist activity or other social gathering. Go to a Rising game. · Ask a friend to have dinner with you. Take care of yourself  · Eat a balanced diet with plenty of fresh fruits and vegetables, whole grains, and lean protein. If you have lost your appetite, eat small snacks rather than large meals. · Avoid using illegal drugs or marijuana and drinking alcohol. Do not take medicines that have not been prescribed for you. They may interfere with medicines you may be taking for depression, or they may make your depression worse. · Take your medicines exactly as they are prescribed. You may start to feel better within 1 to 3 weeks of taking antidepressant medicine. But it can take as many as 6 to 8 weeks to see more improvement. If you have questions or concerns about your medicines, or if you do not notice any improvement by 3 weeks, talk to your doctor. · Continue to take your medicine after your symptoms improve. Taking your medicine for at least 6 months after you feel better can help keep you from getting depressed again. If this isn't the first time you have been depressed, your doctor may recommend you to take medicine even longer. · If you have any side effects from your medicine, tell your doctor. Many side effects are mild and will go away on their own after you have been taking the medicine for a few weeks. Some may last longer. Talk to your doctor if side effects are bothering you too much. You might be able to try a different medicine. · Continue counseling. It may help prevent depression from returning, especially if you've had multiple episodes of depression. Talk with your counselor if you are having a hard time attending your sessions or you think the sessions aren't working. Don't just stop going. · Get enough sleep. Talk to your doctor if you are having problems sleeping. · Avoid sleeping pills unless they are prescribed by the doctor treating your depression.  Sleeping pills may make you groggy during the day, and they may interact with other medicine you are taking. · If you have any other illnesses, such as diabetes, heart disease, or high blood pressure, make sure to continue with your treatment. Tell your doctor about all of the medicines you take, including those with or without a prescription. · If you or someone you know talks about suicide, self-harm, or feeling hopeless, get help right away. Call the [address] [place] [address] at 8-015-953-NTZD ([phone]) or text HOME to 515570 to access the Crisis Text Line. Consider saving these numbers in your phone. When should you call for help? Call 911 anytime you think you may need emergency care. For example, call if:    · You feel like hurting yourself or someone else.     · Someone you know has depression and is about to attempt or is attempting suicide. Call your doctor now or seek immediate medical care if:    · You hear voices.     · Someone you know has depression and:  ? Starts to give away his or her possessions. ? Uses illegal drugs or drinks alcohol heavily. ? Talks or writes about death, including writing suicide notes or talking about guns, knives, or pills. ? Starts to spend a lot of time alone. ? Acts very aggressively or suddenly appears calm. Watch closely for changes in your health, and be sure to contact your doctor if:    · You do not get better as expected. Where can you learn more? Go to http://www.gray.com/  Enter N529 in the search box to learn more about \"Recovering From Depression: Care Instructions. \"  Current as of: June 16, 2021               Content Version: 13.2  © 1695-2073 [employer]. Care instructions adapted under license by [employer] (which disclaims liability or warranty for this information).  If you have questions about a medical condition or this instruction, always ask your healthcare professional. [address] any warranty or liability for your use of this information.

## 2022-06-14 NOTE — PROGRESS NOTES
Judith Del Toro is a 29 y.o. female who was seen by synchronous (real-time) audio-video technology on 6/13/2022 for Medication Evaluation, Depression, and Behavioral Problem        Assessment & Plan:   Diagnoses and all orders for this visit:    1. Attention deficit disorder, unspecified hyperactivity presence  Symptoms controlled   review is without irregularities  Continue rx  Face to face visit required in 3 months for add'l refills  Urine compliance screening needed at f/u  -     dextroamphetamine-amphetamine (ADDERALL) 20 mg tablet; Take 1 Tablet by mouth daily. - must last 30 days  -     dextroamphetamine-amphetamine (ADDERALL) 20 mg tablet; Take 1 Tablet by mouth daily. - must last 30 days  -     dextroamphetamine-amphetamine (ADDERALL) 20 mg tablet; Take 1 Tablet by mouth daily. Max Daily Amount: 20 mg. - must last 30 days    2. Recurrent depression  3. DANNY  Resume Rx  Counseling recommended  -     clonazePAM (KlonoPIN) 0.5 mg tablet; Take 1 Tablet by mouth two (2) times a day. As needed for anxiety - must last 30 days  -     citalopram (CELEXA) 20 mg tablet; Take 2 Tablets by mouth daily. Follow-up and Dispositions    · Return in about 6 weeks (around 7/25/2022), or if symptoms worsen or fail to improve, for anxiety, ADHD, dpression. I have discussed the diagnosis with the patient and the intended plan as seen in the above orders, and questions were answered concerning future plans. Patient conveyed understanding of the plan at the time of the visit. 712  Subjective:     HPI:    Presents for follow-up of anxiety, depression, and ADHD. Patient reports getting off track with her medications, had stopped taking all of her medications and has noticed increasing symptoms of anxiety and depression, as well as problems with focus and staying on task.   Patient reports her father has been diagnosed with cancer of the kidney and prostate and is not doing well, this is led to a great deal of stress, she has had difficulty coping with this. She would like to restart her citalopram, clonazepam, and Adderall so she can \"get back on track. \"  Denies thoughts of harming self or others. Prior to Admission medications    Medication Sig Start Date End Date Taking? Authorizing Provider   dextroamphetamine-amphetamine (ADDERALL) 20 mg tablet Take 1 Tablet by mouth daily. - must last 30 days 8/8/22  Yes Robert Peter, NP   dextroamphetamine-amphetamine (ADDERALL) 20 mg tablet Take 1 Tablet by mouth daily. - must last 30 days 7/11/22  Yes Robert Peter Q, NP   dextroamphetamine-amphetamine (ADDERALL) 20 mg tablet Take 1 Tablet by mouth daily. Max Daily Amount: 20 mg. - must last 30 days 6/13/22  Yes Robert Peter, NP   clonazePAM (KlonoPIN) 0.5 mg tablet Take 1 Tablet by mouth two (2) times a day. As needed for anxiety - must last 30 days 6/13/22  Yes Robert Peter, NP   citalopram (CELEXA) 20 mg tablet Take 2 Tablets by mouth daily. 6/13/22  Yes Robert Peter, NP   citalopram (CELEXA) 20 mg tablet Take 2 Tablets by mouth daily. Patient not taking: Reported on 6/13/2022 8/11/21 6/13/22  Jordyn Blandon NP   clonazePAM (KlonoPIN) 0.5 mg tablet Take 1 Tablet by mouth two (2) times a day. As needed for anxiety - must last 30 days  Patient not taking: Reported on 6/13/2022 8/11/21 6/13/22  Jordyn Blandon NP   dextroamphetamine-amphetamine (ADDERALL) 20 mg tablet Take 1 Tablet by mouth daily. Max Daily Amount: 20 mg. - must last 30 days  Patient not taking: Reported on 6/13/2022 8/11/21 6/13/22  Jordyn Blandon NP   dextroamphetamine-amphetamine (ADDERALL) 20 mg tablet Take 1 Tablet by mouth daily. - must last 30 days  Patient not taking: Reported on 6/13/2022 8/11/21 6/13/22  Jordyn Abelson, NP   dextroamphetamine-amphetamine (ADDERALL) 20 mg tablet Take 1 Tablet by mouth daily.  - must last 30 days  Patient not taking: Reported on 6/13/2022 8/11/21 6/13/22  Jordyn Blandon NP baclofen (LIORESAL) 10 mg tablet Take 1 Tab by mouth three (3) times daily. As needed for tension headache  Patient not taking: Reported on 2021   Kalyani Grant NP     Patient Active Problem List   Diagnosis Code    HPV (human papillomavirus)     Depression with anxiety F41.8    ADD (attention deficit disorder) F98.8    Schizoaffective disorder, bipolar type ([address] F25.0    ETOH abuse F10.10    Narcotic abuse ([address] F11.10    Severe depression ([address] F32.2     Allergies   Allergen Reactions    Lortab [Hydrocodone-Acetaminophen] Other (comments)     FACIAL SWELLING    Morphine Other (comments)     Blisters on body -   Denies Medication Allergy/Reaction on 10/11/2017     Past Medical History:   Diagnosis Date         ADD (attention deficit disorder) 2010    Depression     Depression with anxiety 2010    ETOH abuse 2010    Headache     HPV (human papillomavirus) 2010    Musculoskeletal disorder     Narcotic abuse ([address] 2010    Schizoaffective disorder, bipolar type ([address] 2010     Past Surgical History:   Procedure Laterality Date    HX TUBAL LIGATION  2016     Family History   Problem Relation Age of Onset    Stroke Mother     Cancer Mother     Diabetes Father     Cancer Father      Social History     Tobacco Use    Smoking status: Never Smoker    Smokeless tobacco: Never Used   Substance Use Topics    Alcohol use: Yes       Review of Systems   Constitutional: Negative for chills, fever, malaise/fatigue and weight loss. HENT: Negative. Eyes: Negative. Respiratory: Negative. Cardiovascular: Negative. Gastrointestinal: Negative. Genitourinary: Negative. Musculoskeletal: Negative. Skin: Negative. Neurological: Negative. Endo/Heme/Allergies: Negative. Psychiatric/Behavioral: Positive for depression. Negative for hallucinations, memory loss, substance abuse and suicidal ideas. The patient is nervous/anxious. The patient does not have insomnia. Objective:   No flowsheet data found. General: alert, cooperative, no distress   Mental  status: normal mood, behavior, speech, dress, motor activity, and thought processes, able to follow commands   HENT: NCAT   Neck: no visualized mass   Resp: no respiratory distress   Neuro: no gross deficits   Skin: no discoloration or lesions of concern on visible areas   Psychiatric: normal affect, consistent with stated mood, no evidence of hallucinations     Additional exam findings:   none    We discussed the expected course, resolution and complications of the diagnosis(es) in detail. Medication risks, benefits, costs, interactions, and alternatives were discussed as indicated. I advised her to contact the office if her condition worsens, changes or fails to improve as anticipated. She expressed understanding with the diagnosis(es) and plan. Xavi Mojica was evaluated through a synchronous (real-time) audio-video encounter. The patient (or guardian if applicable) is aware that this is a billable service, which includes applicable co-pays. This Virtual Visit was conducted with patient's (and/or legal guardian's) consent. The visit was conducted pursuant to the emergency declaration under the [address] [place] [address], [address] [place] [address] [facility] authority and the [employer] General Act. Patient identification was verified, and a caregiver was present when appropriate.   The patient was located at: Home: [facility]   The provider was located at: Home:         Zacarias Mohs, NP  6/13/2022

## 2022-09-03 DIAGNOSIS — F33.9 RECURRENT DEPRESSION (HCC): ICD-10-CM

## 2022-09-05 RX ORDER — CITALOPRAM 20 MG/1
TABLET, FILM COATED ORAL
Qty: 60 TABLET | Refills: 2 | Status: SHIPPED | OUTPATIENT
Start: 2022-09-05

## 2022-11-16 ENCOUNTER — VIRTUAL VISIT (OUTPATIENT)
Dept: FAMILY MEDICINE CLINIC | Age: 37
End: 2022-11-16
Payer: MEDICARE

## 2022-11-16 DIAGNOSIS — F33.9 RECURRENT DEPRESSION (HCC): ICD-10-CM

## 2022-11-16 DIAGNOSIS — F41.1 GAD (GENERALIZED ANXIETY DISORDER): Primary | ICD-10-CM

## 2022-11-16 PROCEDURE — 99213 OFFICE O/P EST LOW 20 MIN: CPT | Performed by: NURSE PRACTITIONER

## 2022-11-16 PROCEDURE — G8427 DOCREV CUR MEDS BY ELIG CLIN: HCPCS | Performed by: NURSE PRACTITIONER

## 2022-11-16 PROCEDURE — G9717 DOC PT DX DEP/BP F/U NT REQ: HCPCS | Performed by: NURSE PRACTITIONER

## 2022-11-16 RX ORDER — QUETIAPINE FUMARATE 25 MG/1
25 TABLET, FILM COATED ORAL
Qty: 30 TABLET | Refills: 3 | Status: SHIPPED | OUTPATIENT
Start: 2022-11-16

## 2022-11-16 RX ORDER — CITALOPRAM 20 MG/1
20 TABLET, FILM COATED ORAL DAILY
Qty: 30 TABLET | Refills: 2 | Status: SHIPPED | OUTPATIENT
Start: 2022-11-16

## 2022-11-16 NOTE — PROGRESS NOTES
Chief Complaint   Patient presents with    Medication Evaluation     Pt vamshi seen for med eval  -pt states she was possibly exposed to HIV and went to her gyn to be tested  -pt states that she wants to go back on her anxiety medication    1. Have you been to the ER, urgent care clinic since your last visit? Hospitalized since your last visit? No    2. Have you seen or consulted any other health care providers outside of the [address] [place] [address] since your last visit?   Include any pap smears or colon screening. gyn    Pt has no other concerns

## 2022-11-16 NOTE — PROGRESS NOTES
Larry Pedroza (: 1985) is a 40 y.o. female, established patient, here for evaluation of the following chief complaint(s):   Medication Evaluation       ASSESSMENT/PLAN:  Below is the assessment and plan developed based on review of pertinent history, labs, studies, and medications. 1. DANNY (generalized anxiety disorder)  2. Recurrent depression (HCC)  -     citalopram (CELEXA) 20 mg tablet; Take 1 Tablet by mouth daily. , Normal, Disp-30 Tablet, R-2    Restart the celexa 20mg a day  Go ahead and  the clonazepam but warned her on using this and getting back into the same cycle she was on  Follow up in 2 weeks    No follow-ups on file.     SUBJECTIVE/OBJECTIVE:  HPI  Had taken herself off all meds for add and benzo  Had been doing very well until the eladia she is dating may have given her HIV  Got tested today with Dr. Henry Peterson, gyn  Gyn did give her prozac and klonopin  Had been on celexa in the past    Review of Systems   A comprehensive review of system was obtained and negative except findings in the HPI      No data recorded     Physical Exam    [INSTRUCTIONS:  \"[x]\" Indicates a positive item  \"[]\" Indicates a negative item  -- DELETE ALL ITEMS NOT EXAMINED]    Constitutional: [x] Appears well-developed and well-nourished [x] No apparent distress      [] Abnormal -     Mental status: [x] Alert and awake  [x] Oriented to person/place/time [x] Able to follow commands    [] Abnormal -     Eyes:   EOM    [x]  Normal    [] Abnormal -   Sclera  [x]  Normal    [] Abnormal -          Discharge [x]  None visible   [] Abnormal -     HENT: [x] Normocephalic, atraumatic  [] Abnormal -   [x] Mouth/Throat: Mucous membranes are moist    External Ears [x] Normal  [] Abnormal -    Neck: [x] No visualized mass [] Abnormal -     Pulmonary/Chest: [x] Respiratory effort normal   [x] No visualized signs of difficulty breathing or respiratory distress        [] Abnormal -      Musculoskeletal:   [x] Normal gait with no signs of ataxia         [x] Normal range of motion of neck        [] Abnormal -     Neurological:        [x] No Facial Asymmetry (Cranial nerve 7 motor function) (limited exam due to video visit)          [x] No gaze palsy        [] Abnormal -          Skin:        [x] No significant exanthematous lesions or discoloration noted on facial skin         [] Abnormal -            Psychiatric:       [x] Normal Affect [] Abnormal -        [x] No Hallucinations    Other pertinent observable physical exam findings:-    On this date 11/16/2022 I have spent 15 minutes reviewing previous notes, test results and face to face (virtual) with the patient discussing the diagnosis and importance of compliance with the treatment plan as well as documenting on the day of the visit. Parker Garcia, was evaluated through a synchronous (real-time) audio-video encounter. The patient (or guardian if applicable) is aware that this is a billable service, which includes applicable co-pays. This Virtual Visit was conducted with patient's (and/or legal guardian's) consent. The visit was conducted pursuant to the emergency declaration under the [address] [place] [address] authority and the [employer] and [employer] Act. Patient identification was verified, and a caregiver was present when appropriate. The patient was located at: Home: [address] [place] [address]  The provider was located at: Home: [unfilled]       An electronic signature was used to authenticate this note.   -- Geo Pandya NP

## 2023-02-09 DIAGNOSIS — F33.9 RECURRENT DEPRESSION (HCC): ICD-10-CM

## 2023-02-09 RX ORDER — CITALOPRAM 20 MG/1
TABLET, FILM COATED ORAL
Qty: 30 TABLET | Refills: 2 | Status: SHIPPED | OUTPATIENT
Start: 2023-02-09

## 2023-02-23 ENCOUNTER — TELEPHONE (OUTPATIENT)
Dept: FAMILY MEDICINE CLINIC | Age: 38
End: 2023-02-23

## 2023-02-23 NOTE — TELEPHONE ENCOUNTER
Discharge Instructions      Hypotension  As your heart beats, it forces blood through your body. This force is called blood pressure. If you have hypotension, you have low blood pressure. When your blood pressure is too low, you may not get enough blood to your brain or other parts of your body. This may cause you to feel weak, light-headed, have a fast heartbeat, or even pass out (faint). Low blood pressure may be harmless, or it may cause serious problems.  What are the causes?  · Blood loss.  · Not enough water in the body (dehydration).  · Heart problems.  · Hormone problems.  · Pregnancy.  · A very bad infection.  · Not having enough of certain nutrients.  · Very bad allergic reactions.  · Certain medicines.  What increases the risk?  · Age. The risk increases as you get older.  · Conditions that affect the heart or the brain and spinal cord (central nervous system).  · Taking certain medicines.  · Being pregnant.  What are the signs or symptoms?  · Feeling:  ? Weak.  ? Light-headed.  ? Dizzy.  ? Tired (fatigued).  · Blurred vision.  · Fast heartbeat.  · Passing out, in very bad cases.  How is this treated?  · Changing your diet. This may involve eating more salt (sodium) or drinking more water.  · Taking medicines to raise your blood pressure.  · Changing how much you take (the dosage) of some of your medicines.  · Wearing compression stockings. These stockings help to prevent blood clots and reduce swelling in your legs.  In some cases, you may need to go to the hospital for:  · Fluid replacement. This means you will receive fluids through an IV tube.  · Blood replacement. This means you will receive donated blood through an IV tube (transfusion).  · Treating an infection or heart problems, if this applies.  · Monitoring. You may need to be monitored while medicines that you are taking wear off.  Follow these instructions at home:  Eating and drinking    · Drink enough fluids to keep your pee (urine) pale  We do not, she will need to go to a Suboxone clinic for that.  Jud Farmer yellow.  · Eat a healthy diet. Follow instructions from your doctor about what you can eat or drink. A healthy diet includes:  ? Fresh fruits and vegetables.  ? Whole grains.  ? Low-fat (lean) meats.  ? Low-fat dairy products.  · Eat extra salt only as told. Do not add extra salt to your diet unless your doctor tells you to.  · Eat small meals often.  · Avoid standing up quickly after you eat.  Medicines  · Take over-the-counter and prescription medicines only as told by your doctor.  ? Follow instructions from your doctor about changing how much you take of your medicines, if this applies.  ? Do not stop or change any of your medicines on your own.  General instructions    · Wear compression stockings as told by your doctor.  · Get up slowly from lying down or sitting.  · Avoid hot showers and a lot of heat as told by your doctor.  · Return to your normal activities as told by your doctor. Ask what activities are safe for you.  · Do not use any products that contain nicotine or tobacco, such as cigarettes, e-cigarettes, and chewing tobacco. If you need help quitting, ask your doctor.  · Keep all follow-up visits as told by your doctor. This is important.  Contact a doctor if:  · You throw up (vomit).  · You have watery poop (diarrhea).  · You have a fever for more than 2-3 days.  · You feel more thirsty than normal.  · You feel weak and tired.  Get help right away if:  · You have chest pain.  · You have a fast or uneven heartbeat.  · You lose feeling (have numbness) in any part of your body.  · You cannot move your arms or your legs.  · You have trouble talking.  · You get sweaty or feel light-headed.  · You pass out.  · You have trouble breathing.  · You have trouble staying awake.  · You feel mixed up (confused).  Summary  · Hypotension is also called low blood pressure. It is when the force of blood pumping through your arteries is too weak.  · Hypotension may be harmless, or it may cause serious  problems.  · Treatment may include changing your diet and medicines, and wearing compression stockings.  · In very bad cases, you may need to go to the hospital.  This information is not intended to replace advice given to you by your health care provider. Make sure you discuss any questions you have with your health care provider.  Document Released: 03/14/2011 Document Revised: 06/13/2019 Document Reviewed: 06/13/2019  [employer] Patient Education © 2020 [employer]      Discharged to home by car with relative. Discharged via wheelchair, hospital escort: Yes.  Special equipment needed: Not Applicable    Be sure to schedule a follow-up appointment with your primary care doctor or any specialists as instructed.     Discharge Plan:   Influenza Vaccine Indication: Not indicated: Previously immunized this influenza season and > 8 years of age    I understand that a diet low in cholesterol, fat, and sodium is recommended for good health. Unless I have been given specific instructions below for another diet, I accept this instruction as my diet prescription.   Other diet: Cardiac    Special Instructions: None    · Is patient discharged on Warfarin / Coumadin?   No            Depression / Suicide Risk    As [facility] are [facility], it is important to learn how to keep safe from harming yourself.    Recognize the warning signs:  · Abrupt changes in personality, positive or negative- including increase in energy   · Giving away possessions  · Change in eating patterns- significant weight changes-  positive or negative  · Change in sleeping patterns- unable to sleep or sleeping all the time   · Unwillingness or inability to communicate  · Depression  · Unusual sadness, discouragement and loneliness  · Talk of wanting to die  · Neglect of personal appearance   · Rebelliousness- reckless behavior  · Withdrawal from people/activities they love  · Confusion- inability to concentrate     If you or a  loved one observes any of these behaviors or has concerns about self-harm, here's what you can do:  · Talk about it- your feelings and reasons for harming yourself  · Remove any means that you might use to hurt yourself (examples: pills, rope, extension cords, firearm)  · Get professional help from the community (Mental Health, Substance Abuse, psychological counseling)  · Do not be alone:Call your Safe Contact- someone whom you trust who will be there for you.  · Call your local CRISIS HOTLINE 747-4612 or 692-846-3045  · Call your local Children's Mobile Crisis Response Team [place] (761) 251-9338 or www.[employer]  · Call the toll free National Suicide Prevention Hotlines   · National Suicide Prevention Lifeline 924-682-GHDR (2042)  · National Hope Line Network 800-SUICIDE (863-8093)

## 2023-03-13 RX ORDER — QUETIAPINE FUMARATE 25 MG/1
25 TABLET, FILM COATED ORAL
Qty: 30 TABLET | Refills: 3 | Status: SHIPPED | OUTPATIENT
Start: 2023-03-13

## 2023-04-29 RX ORDER — QUETIAPINE FUMARATE 25 MG/1
TABLET, FILM COATED ORAL
COMMUNITY
Start: 2023-03-13

## 2023-04-29 RX ORDER — CLONAZEPAM 0.5 MG/1
TABLET ORAL 2 TIMES DAILY
COMMUNITY
Start: 2022-06-13

## 2023-04-29 RX ORDER — CITALOPRAM 20 MG/1
TABLET ORAL
COMMUNITY
Start: 2023-02-09 | End: 2023-05-08

## 2023-05-06 DIAGNOSIS — F33.9 MAJOR DEPRESSIVE DISORDER, RECURRENT, UNSPECIFIED (HCC): ICD-10-CM

## 2023-05-08 RX ORDER — CITALOPRAM 20 MG/1
TABLET ORAL
Qty: 30 TABLET | Refills: 2 | Status: SHIPPED | OUTPATIENT
Start: 2023-05-08

## 2023-07-15 RX ORDER — QUETIAPINE FUMARATE 25 MG/1
TABLET, FILM COATED ORAL
Qty: 30 TABLET | Refills: 3 | Status: SHIPPED | OUTPATIENT
Start: 2023-07-15

## 2023-08-22 DIAGNOSIS — F33.9 MAJOR DEPRESSIVE DISORDER, RECURRENT, UNSPECIFIED (HCC): ICD-10-CM

## 2023-08-22 RX ORDER — CITALOPRAM 20 MG/1
TABLET ORAL
Qty: 30 TABLET | Refills: 2 | Status: SHIPPED | OUTPATIENT
Start: 2023-08-22